# Patient Record
Sex: MALE | Race: WHITE | NOT HISPANIC OR LATINO | Employment: FULL TIME | URBAN - METROPOLITAN AREA
[De-identification: names, ages, dates, MRNs, and addresses within clinical notes are randomized per-mention and may not be internally consistent; named-entity substitution may affect disease eponyms.]

---

## 2017-06-19 ENCOUNTER — ALLSCRIPTS OFFICE VISIT (OUTPATIENT)
Dept: OTHER | Facility: OTHER | Age: 57
End: 2017-06-19

## 2017-12-19 ENCOUNTER — ALLSCRIPTS OFFICE VISIT (OUTPATIENT)
Dept: OTHER | Facility: OTHER | Age: 57
End: 2017-12-19

## 2017-12-20 NOTE — PROGRESS NOTES
Assessment  1  Multiple nevi (216 9) (D22 9)   2  Screening for skin condition (V82 0) (Z13 89)   3  Seborrheic keratosis (702 19) (L82 1)   4  H/O nonmelanoma skin cancer (V10 83) (Z85 828)    Plan   · Follow-up visit in 6 months Evaluation and Treatment  Follow-up  Status: Hold For -Scheduling  Requested for: 71ZLW4074   · Use a sun block product with an SPF of 15 or more ; Status:Complete;   Done:39Waf6604    Discussion/Summary  Discussion Summary- [facility]:  Assessment #1: Screening for dermatologic disorders  Care Plan:  Nothing else of concern noted on complete exam follow-up in 6 months lesion on the upper lip is not discernible at this time patient advise to return follow-up if this appears to be growing otherwise will recheck in 6 months  Assessment #2: Nevi  Care Plan:  Review the concept of ABCD and ugly duckling nothing markedly atypical noted on complete exam   Assessment #3: Seborrheic keratosis  Care Plan:  Patient reassured these are normal growths we acquire with age no treatment needed  Assessment #4: History of skin cancer  Care Plan:  No recurrence nothing else atypical sunblock recommended follow-up in 6 months  Chief Complaint  Chief Complaint Free Text Note Form: 6 month check up      History of Present Illness  HPI: 27-year-old male presents for overall skin check concerned regarding potential skin cancer patient with recent knee surgery was concerned regarding a small skin spot he noted on his left upper lip      Review of Systems  Complete Male Dermatology [facility] Patient:  Constitutional: Denies constitutional symptoms  Eyes: Denies eye symptoms  ENT:  denies ear symptoms, nasal symptoms, mouth or throat symptoms  Cardiovascular: Denies cardiovascular symptoms  Respiratory: Denies respiratory symptoms  Gastrointestinal: Denies gastrointestinal symptoms  Musculoskeletal: Denies musculoskeletal symptoms  Integumentary: Denies skin, hair and nail symptoms  Neurological: Denies neurologic symptoms  Psychiatric: Denies psychiatric symptoms  Endocrine: Denies endocrine symptoms  Hematologic/Lymphatic: Denies hematologic symptoms  Active Problems  1  Actinic keratosis (702 0) (L57 0)   2  Basal cell carcinoma of skin of nose (173 31) (C44 311)   3  Basal Cell Carcinoma Of Skin Of Trunk (173 51)   4  Changing skin lesion (709 9) (L98 9)   5  Chest pain, unspecified type (786 50) (R07 9)   6  Contusion of lower leg (924 10) (S80 10XA)   7  Dyskinesia (781 3) (G24 9)   8  Headache (784 0) (R51)   9  Hypertension (401 9) (I10)   10  Knee pain (719 46) (M25 569)   11  Malignant neoplasm of skin (173 90) (C44 90)   12  Multiple nevi (216 9) (D22 9)   13  Patellar tendonitis (726 64) (M76 50)   14  Prepatellar bursitis (726 65) (M70 40)   15  Screening for skin condition (V82 0) (Z13 89)   16  Seborrheic keratosis (702 19) (L82 1)   17  Strain of quadriceps tendon (844 9) (S76 119A)   18  Systemic lupus erythematosus (710 0) (M32 9)   19  Tear of medial meniscus of knee (836 0) (S83 249A)   20  Thigh pain (729 5) (M79 659)   21  Vitamin D deficiency (268 9) (E55 9)    Past Medical History  1  History of Basal Cell Carcinoma Of Skin Of Trunk (173 51)   2  H/O nonmelanoma skin cancer (V10 83) (O02 469)   3  History of basal cell carcinoma (V10 83) (Z85 828)   4  History of Hypertrophic condition of skin (701 9) (L91 9)   5  History of Lupus erythematosus (695 4) (L93 0)   6  History of Orthopedic Surgery  Past Medical History Reviewed- Derm:   The past medical history was reviewed  Surgical History  1  History of Ankle Surgery   2  History of Arthroscopy Knee Left   3  History of Arthroscopy Knee Right   4  History of Biopsy Temporal Artery   5  History of Knee Surgery   6  History of Orthopedic Surgery Right Hand   7  History of Rhinoplasty   8  History of Shoulder Surgery   9   History of Tonsillectomy  Surgical History Reviewed [facility]:   Surgical History reviewed      Family History  Mother    1  Family history of cataracts (V19 19) (J07 202)  Father    2  Family history of Diverticulitis   3  Family history of diabetes mellitus (V18 0) (Z83 3)   4  Family history of hyperlipidemia (V18 19) (Z83 49)   5  Family history of hypertension (V17 49) (Z82 49)  Family History Reviewed- Derm:   Family History was reviewed      Social History   · Employed   · Exercises moderately 3 or more times a week   · Four children   ·    · Never A Smoker   · Occasional alcohol use   · Occasional caffeine consumption  Social History Reviewed [facility]: The social history was reviewed      Current Meds   1  AmLODIPine Besylate 5 MG Oral Tablet; TAKE 1 TABLET DAILY FOR BLOOD PRESSURE; Therapy: 01Xgr8748 to (Evaluate:73Suf4592)  Requested for: 66Vjb7423; Last Rx:41Cfw1610 Ordered   2  Aspirin EC Lo-Dose 81 MG TBEC; TAKE 1 TABLET DAILY; Therapy: (Romelia Covelus) to Recorded   3  ClonazePAM 0 5 MG Oral Tablet; TAKE 1 TABLET TWICE DAILY AS NEEDED; Therapy: 88NVT7981 to (Evaluate:13Jun2015) Recorded   4  Fish Oil CAPS; Take 1 capsule twice daily; Therapy: (Romelia Covelus) to Recorded   5  Hydrocodone-Acetaminophen 5-325 MG Oral Tablet; take 1 tablet every 8 hours prn pain; Therapy: 77IVP1706 to (Evaluate:11Ank6970) Recorded   6  Ibuprofen 600 MG Oral Tablet; TAKE 1 TABLET 3 TIMES DAILY WITH FOOD AS NEEDED; Therapy: 24VFB1439 to (Evaluate:18Jun2015) Recorded   7  Inderal LA 60 MG Oral Capsule Extended Release 24 Hour; take 1 capsule daily; Therapy: (Romelia Hippo) to Recorded   8  Multivitamins CAPS; TAKE 1 CAPSULE DAILY; Therapy: (Romelia Hippo) to Recorded   9  Omeprazole 20 MG Oral Capsule Delayed Release; TAKE 1 CAPSULE DAILY; Therapy: (Romelia Covelus) to Recorded   10  Plaquenil 200 MG Oral Tablet; TAKE 1 TABLET TWICE DAILY; Therapy: (Romelia Hippo) to Recorded   11  Probiotic CAPS; USE AS DIRECTED; Therapy: (Romelia Hippo) to Recorded   12  Protonix 40 MG Oral Tablet Delayed Release; TAKE 1 TABLET DAILY; Therapy: (Jenise Castro) to Recorded   13  TraMADol HCl - 50 MG Oral Tablet; TAKE 1 TABLET 3 TIMES DAILY AS NEEDED; Last  Rx:27Apr2016 Ordered   14  Vitamin D TABS; Therapy: (Recorded:29Fsu7299) to Recorded  Medication List Reviewed: The medication list was reviewed and updated today  Allergies  1  nadolol    Physical Exam   Constitutional  General appearance: Appears healthy and well developed  Lymphatic  No visible disturbance  Musculoskeletal  Digits and nails: No clubbing, cyanosis or edema  Cutaneous and nail exam normal    Skin  Scalp skin texture and hair distribution: Normal skin texture on scalp, normal hair distribution  Head: Normal turgor, no rashes, no lesions  Neck: Normal turgor, no rashes, no lesions  Chest: Normal turgor, no rashes, no lesions  Abdomen: Normal turgor, no rashes, no lesions  Back: Normal turgor, no rashes, no lesions  Right upper extremity: Normal turgor, no rashes, no lesions  Left upper extremity: Normal turgor, no rashes, no lesions  Right lower extremity: Normal turgor, no rashes, no lesions  Left lower extremity: Normal turgor, no rashes, no lesions  Neuro/Psych  Alert and oriented x 3  Displays comfort and cooperation during encounterl  Affect is normal    Finding Previous sites of skin cancer well healed without recurrence normal keratotic papules with greasy stuck on appearance normal pigmented lesions with regular shape and color 1 mm papule noted on the upper lip difficult to discern morphology  Future Appointments    Date/Time Provider Specialty Site   06/20/2018 03:45 PM ROLANDA Delgado   [facility]ermatology [facility]     Signatures   Electronically signed by : ROLANDA Banerjee ; Dec 19 2017  4:37PM EST                       (Author)

## 2018-01-16 NOTE — RESULT NOTES
Message   tell patient lesion is benign     Verified Results  (1) TISSUE EXAM 49CGE3027 05:08PM Georgie Potter Order Number: [id number]     Test Name Result Flag Reference   LAB AP CASE REPORT (Report)     Surgical Pathology Report             Case: [id number]                   Authorizing Provider: Alyssa Camejo MD     Collected:      12/07/2016 1708        Pathologist:      Gregory Shaw MD      Received:      12/09/2016 1620        Specimen:  Other, L Nares   LAB AP FINAL DIAGNOSIS (Report)     A  Skin, Left Nares, shave biopsy:  - Fibrous papule of nose/face, minute superficial portions  - Negative for malignancy  Interpretation performed at [place] [address]  Electronically signed by Gregory Shaw MD on 12/14/2016 at 4:09 PM   LAB AP SURGICAL ADDITIONAL INFORMATION (Report)     These tests were developed and their performance characteristics   determined by [facility] or [address] [place] [address]  They may not be cleared or approved by the [place]  Food and   Drug Administration  The FDA has determined that such clearance or   approval is not necessary  These tests are used for clinical purposes  They should not be regarded as investigational or for research  This   laboratory has been approved by [place] [address], designated as a high-complexity   laboratory and is qualified to perform these tests  LAB AP GROSS DESCRIPTION (Report)     A  The specimen is received in formalin, labeled with the patient's name   and hospital number, and is designated left nares shave  The specimen   consists of 2 tan non-hairbearing shaves of skin measuring 0 1 cm and 0 2   cm in greatest dimension  The surfaces appear unremarkable  The apparent   margin of resection is inked blue, and the apparent surface is inked red  Entirely submitted  One cassette      Note: The estimated total formalin fixation time based upon information   provided by the submitting clinician and the standard processing schedule   is over 72 hours   [facility]   LAB AP CLINICAL INFORMATION      TW Order Number: [id number]  Angiofibroma vs  BCC

## 2018-01-24 NOTE — PROGRESS NOTES
Assessment    1  Changing skin lesion (709 9) (L98 9)   2  Multiple nevi (216 9) (D22 9)   3  Seborrheic keratosis (702 19) (L82 1)   4  Screening for skin condition (V82 0) (Z13 89)   5  H/O nonmelanoma skin cancer (V10 83) (Z85 828)    Plan    · Wound care as instructed ; Status:Complete;   Done: 71OJM2298   · (1) TISSUE EXAM; Status: In Progress - Specimen/Data Collected,Retrospective By  Protocol Authorization;   Done: 47ABB3193  A : JOELLE SULTANA Date/Time: : 20CLK5283  A : Skin Shave  Impression: : ANGIOFIBROMA VS BCC    · Use a sun block product with an SPF of 15 or more ; Status:Complete;   Done:  20XCN1576   · Follow-up visit in 6 months Evaluation and Treatment  Follow-up  Status: Complete   Done: 42XTB3122    Discussion/Summary  Discussion Summary- [facility]:   Assessment #1: Changing skin lesion  Care Plan:   Question atypia question of angiofibroma versus verruca await result of biopsy if further treatment indicated  Assessment #2: Nevi  Care Plan:   Review the concept of ABCD and ugly duckling nothing markedly atypical noted on complete exam    Assessment #3: Seborrheic keratosis  Care Plan:   Patient reassured these are normal growths we acquire with age no treatment needed  Assessment #4: History of skin cancer  Care Plan:   No recurrence nothing else atypical sunblock recommended follow-up in 6 months  Assessment #5: Screening for dermatologic disorders  Care Plan:   Nothing else of concern noted on complete exam follow-up in 6 months  Chief Complaint  Chief Complaint Free Text Note Form: 6 MONTH FULL BODY SKIN CANCER EXAM      History of Present Illness  HPI: 52-year-old male presents for overall checkup previous history skin cancer concerned regarding a new growth on his left nares      Review of Systems  Complete Male Dermatology [employer]- [facility] Patient:   Constitutional: Denies constitutional symptoms  Eyes: Denies eye symptoms     ENT:  denies ear symptoms, nasal symptoms, mouth or throat symptoms  Cardiovascular: Denies cardiovascular symptoms  Respiratory: Denies respiratory symptoms  Gastrointestinal: Denies gastrointestinal symptoms  Musculoskeletal: Denies musculoskeletal symptoms  Integumentary: Denies skin, hair and nail symptoms  Neurological: Denies neurologic symptoms  Psychiatric: Denies psychiatric symptoms  Endocrine: Denies endocrine symptoms  Hematologic/Lymphatic: Denies hematologic symptoms  Active Problems    1  Actinic keratosis (702 0) (L57 0)   2  Basal cell carcinoma of skin of nose (173 31) (C44 311)   3  Basal Cell Carcinoma Of Skin Of Trunk (173 51)   4  Changing skin lesion (709 9) (L98 9)   5  Chest pain, unspecified type (786 50) (R07 9)   6  Contusion of lower leg (924 10) (S80 10XA)   7  Dyskinesia (781 3) (G24 9)   8  Headache (784 0) (R51)   9  Hypertension (401 9) (I10)   10  Knee pain (719 46) (M25 569)   11  Malignant neoplasm of skin (173 90) (C44 90)   12  Multiple nevi (216 9) (D22 9)   13  Patellar tendonitis (726 64) (M76 50)   14  Prepatellar bursitis (726 65) (M70 40)   15  Screening for skin condition (V82 0) (Z13 89)   16  Seborrheic keratosis (702 19) (L82 1)   17  Strain of quadriceps tendon (844 9) (S76 119A)   18  Systemic lupus erythematosus (710 0) (M32 9)   19  Tear of medial meniscus of knee (836 0) (S83 249A)   20  Thigh pain (729 5) (M79 659)   21  Vitamin D deficiency (268 9) (E55 9)    Past Medical History    1  History of Basal Cell Carcinoma Of Skin Of Trunk (173 51)   2  H/O nonmelanoma skin cancer (V10 83) (D69 007)   3  History of basal cell carcinoma (V10 83) (Z85 828)   4  History of Hypertrophic condition of skin (701 9) (L91 9)   5  History of Lupus erythematosus (695 4) (L93 0)   6  History of Orthopedic Surgery  Past Medical History Reviewed- Derm:   The past medical history was reviewed  Surgical History    1  History of Ankle Surgery   2  History of Arthroscopy Knee Left   3  History of Arthroscopy Knee Right   4  History of Biopsy Temporal Artery   5  History of Knee Surgery   6  History of Orthopedic Surgery Right Hand   7  History of Rhinoplasty   8  History of Shoulder Surgery   9  History of Tonsillectomy  Surgical History Reviewed [facility]:   Surgical History reviewed      Family History  Mother    1  Family history of cataracts (V19 19) (U80 213)  Father    2  Family history of Diverticulitis   3  Family history of diabetes mellitus (V18 0) (Z83 3)   4  Family history of hyperlipidemia (V18 19) (Z83 49)   5  Family history of hypertension (V17 49) (Z82 49)  Family History Reviewed- Derm:   Family History was reviewed      Social History    · Employed   · Exercises moderately 3 or more times a week   · Four children   ·    · Never A Smoker   · Occasional alcohol use   · Occasional caffeine consumption  Social History Reviewed [facility]- [facility]: The social history was reviewed      Current Meds   1  AmLODIPine Besylate 5 MG Oral Tablet; TAKE 1 TABLET DAILY FOR BLOOD   PRESSURE; Therapy: 27Apr2016 to (Evaluate:89Dwa5894)  Requested for: 75Ovw3793; Last   Rx:22Gzr5132 Ordered   2  Aspirin EC Lo-Dose 81 MG TBEC; TAKE 1 TABLET DAILY; Therapy: (Chace Vasquez) to Recorded   3  ClonazePAM 0 5 MG Oral Tablet; TAKE 1 TABLET TWICE DAILY AS NEEDED; Therapy: 45JIP0453 to (Evaluate:13Jun2015) Recorded   4  Fish Oil CAPS; Take 1 capsule twice daily; Therapy: Rolando Vasquez) to Recorded   5  Hydrocodone-Acetaminophen 5-325 MG Oral Tablet; take 1 tablet every 8 hours prn pain; Therapy: 21MMX7741 to (Evaluate:90Qbi9494) Recorded   6  Ibuprofen 600 MG Oral Tablet; TAKE 1 TABLET 3 TIMES DAILY WITH FOOD AS NEEDED; Therapy: 12DVS4692 to (Evaluate:18Jun2015) Recorded   7  Inderal LA 60 MG Oral Capsule Extended Release 24 Hour; take 1 capsule daily; Therapy: (Chace Vasquez) to Recorded   8  Multivitamins CAPS; TAKE 1 CAPSULE DAILY;    Therapy: Rolando Vasquez) to Recorded   9  Omeprazole 20 MG Oral Capsule Delayed Release; TAKE 1 CAPSULE DAILY; Therapy: (Citlali Mccartney) to Recorded   10  Plaquenil 200 MG Oral Tablet; TAKE 1 TABLET TWICE DAILY; Therapy: (Citlali Mccartney) to Recorded   11  Probiotic CAPS; USE AS DIRECTED; Therapy: (Citlali Mccartney) to Recorded   12  Protonix 40 MG Oral Tablet Delayed Release; TAKE 1 TABLET DAILY; Therapy: (Citlali Mccartney) to Recorded   13  TraMADol HCl - 50 MG Oral Tablet; TAKE 1 TABLET 3 TIMES DAILY AS NEEDED; Last    Rx:27Apr2016 Ordered   14  Vitamin D TABS; Therapy: (Recorded:79Zdz7633) to Recorded  Medication List Reviewed: The medication list was reviewed and updated today  Allergies    1  nadolol    Physical Exam    Constitutional   General appearance: Appears healthy and well developed  Lymphatic   No visible disturbance  Musculoskeletal   Digits and nails: No clubbing, cyanosis or edema  Cutaneous and nail exam normal     Skin   Scalp skin texture and hair distribution: Normal skin texture on scalp, normal hair distribution  Head: Abnormal     Neck: Normal turgor, no rashes, no lesions  Chest: Normal turgor, no rashes, no lesions  Abdomen: Normal turgor, no rashes, no lesions  Back: Normal turgor, no rashes, no lesions  Right upper extremity: Normal turgor, no rashes, no lesions  Left upper extremity: Normal turgor, no rashes, no lesions  Right lower extremity: Normal turgor, no rashes, no lesions  Left lower extremity: Normal turgor, no rashes, no lesions  Neuro/Psych   Alert and oriented x 3  Displays comfort and cooperation during encounterl  Affect is normal     Finding 1 mm fleshy papules left nasal alar rim previous sites of skin cancer well-healed without recurrence normal pigmented lesions with regular shape and color nothing else remarkable normal keratotic papules with greasy stuck on appearance  Procedure    Procedure: skin biopsy     Indications for the procedure include the lesion has changed  Risks, benefits, alternatives, infection risk, bleeding risk, risk of allergic reaction and risk of scarring were discussed with the patient   verbal consent was obtained prior to the procedure  Procedure Note:   Anesthesia: 1 ml of lidocaine 1% with epinephrine  The patient was prepped using alcohol  a shave biopsy of the lesion was taken  The hemostasis of the wound was achieved with aluminum chloride  Dressing: The wound was cleaned and petroleum jelly was applied and a sterile dressing was placed  Specimen: the excised lesion was place in buffered formalin and sent for pathology  Post-Procedure:   Patient Status: the patient tolerated the procedure well  Complications: there were no complications  Follow-up in the office  patient will be called in event skin cancer is found  Patient can call the office in 7-10 days for results if not contacted  Future Appointments    Date/Time Provider Specialty Site   06/07/2017 04:15 PM ROLANDA Gil   Dermatology [facility]     Signatures   Electronically signed by : ROLANDA Jaquez ; Dec  7 2016  5:49PM EST                       (Author)

## 2018-06-20 ENCOUNTER — OFFICE VISIT (OUTPATIENT)
Dept: DERMATOLOGY | Facility: CLINIC | Age: 58
End: 2018-06-20
Payer: COMMERCIAL

## 2018-06-20 DIAGNOSIS — Z85.828 HISTORY OF SKIN CANCER: ICD-10-CM

## 2018-06-20 DIAGNOSIS — Z13.89 SCREENING FOR SKIN CONDITION: ICD-10-CM

## 2018-06-20 DIAGNOSIS — L82.1 SEBORRHEIC KERATOSIS: ICD-10-CM

## 2018-06-20 DIAGNOSIS — D22.9 NEVUS: ICD-10-CM

## 2018-06-20 DIAGNOSIS — L40.9 PSORIASIS: Primary | ICD-10-CM

## 2018-06-20 PROCEDURE — 99213 OFFICE O/P EST LOW 20 MIN: CPT | Performed by: DERMATOLOGY

## 2018-06-20 RX ORDER — BETAMETHASONE DIPROPIONATE 0.5 MG/G
OINTMENT TOPICAL 2 TIMES DAILY
Qty: 45 G | Refills: 1 | Status: SHIPPED | OUTPATIENT
Start: 2018-06-20 | End: 2018-08-24 | Stop reason: SDUPTHER

## 2018-06-20 NOTE — PROGRESS NOTES
[address] [facility] OF [address] [facility] DERMATOLOGY  [address] [place] [address]     MRN: [id number] : 1960  Encounter: [id number]  Patient Information: Stephane Leslie II  Chief complaint:Six-month checkup and rash on hands    History of present illness:  50-year-old male presents for overall skin check and previous history of skin cancer also concerned regarding rash on his hands that have been quite bothersome has been treating this with a moisturizing cream with minimal improvement patient without previous history of psoriasis or eczema but history of lupus noted though he does not have history of any cutaneous rashes  Patient had notes this has been a problem for about a month    No past medical history on file  No past surgical history on file  Social History   History   Alcohol use Not on file     History   Drug use: Unknown     History   Smoking Status    Never Smoker   Smokeless Tobacco    Never Used     No family history on file  Meds/Allergies   Allergies   Allergen Reactions    Nadolol Shortness Of Breath     Category:  Adverse Reaction;     Methyldopa      Other reaction(s): Unknown    Sertraline      tardivedyskinsia       Meds:  Prior to Admission medications    Not on File       Subjective:     Review of Systems:    General: negative for - chills, fatigue, fever,  weight gain or weight loss  Psychological: negative for - anxiety, behavioral disorder, concentration difficulties, decreased libido, depression, irritability, memory difficulties, mood swings, sleep disturbances or suicidal ideation  ENT: negative for - hearing difficulties , nasal congestion, nasal discharge, oral lesions, sinus pain, sneezing, sore throat  Allergy and Immunology: negative for - hives, insect bite sensitivity,  Hematological and Lymphatic: negative for - bleeding problems, blood clots,bruising, swollen lymph nodes  Endocrine: negative for - hair pattern changes, hot flashes, malaise/lethargy, mood swings, palpitations, polydipsia/polyuria, skin changes, temperature intolerance or unexpected weight change  Respiratory: negative for - cough, hemoptysis, orthopnea, shortness of breath, or wheezing  Cardiovascular: negative for - chest pain, dyspnea on exertion, edema,  Gastrointestinal: negative for - abdominal pain, nausea/vomiting  Genito-Urinary: negative for - dysuria, incontinence, irregular/heavy menses or urinary frequency/urgency  Musculoskeletal: negative for - gait disturbance, joint pain, joint stiffness, joint swelling, muscle pain, muscular weakness  Dermatological:  As in HPI  Neurological: negative for confusion, dizziness, headaches, impaired coordination/balance, memory loss, numbness/tingling, seizures, speech problems, tremors or weakness       Objective: There were no vitals taken for this visit  Physical Exam:    General Appearance:    Alert, cooperative, no distress   Head:    Normocephalic, without obvious abnormality, atraumatic           Skin:   A full skin exam was performed including scalp, head scalp, eyes, ears, nose, lips, neck, chest, axilla, abdomen, back, buttocks, bilateral upper extremities, bilateral lower extremities, hands, feet, fingers, toes, fingernails, and toenails  Erythema scaling noted on the palms and soles of the feet well-demarcated KOH prep was performed and negative previous site of skin cancers well healed without recurrence normal keratotic papules with greasy stuck on appearance normal pigmented lesions with regular shape and color nothing markedly atypical on exam     Assessment:     1  Psoriasis     2  Screening for skin condition     3  Seborrheic keratosis     4  History of skin cancer     5   Nevus           Plan:    psoriasis process on the hands and feet more consistent with psoriasis than with any other process lupus usually would involve the dorsum of the hands and arms where no involvement at this time however still possibility of this being lupus type rash also needs to be considered the we would treated similarly at this time will go ahead and treat with topical steroids and re-evaluate if no improvement  Nevi reviewed the concept of ABCDE and ugly duckling nothing markedly atypical patient reassured  Seborrheic keratosis patient reassured these are normal growths we acquire with age no treatment needed  History of skin cancer in no recurrence nothing else atypical sunblock recommended follow-up in 6 months  Screening for dermatologic disorders nothing else of concern noted on complete exam follow-up in 6 months    Araceli Rivera MD  2/54/5115,7:25 PM    Portions of the record may have been created with voice recognition software   Occasional wrong word or "sound a like" substitutions may have occurred due to the inherent limitations of voice recognition software   Read the chart carefully and recognize, using context, where substitutions have occurred

## 2018-06-20 NOTE — PATIENT INSTRUCTIONS
psoriasis process on the hands and feet more consistent with psoriasis than with any other process lupus usually would involve the dorsum of the hands and arms where no involvement at this time however still possibility of this being lupus type rash also needs to be considered the we would treated similarly at this time will go ahead and treat with topical steroids and re-evaluate if no improvement  Nevi reviewed the concept of ABCDE and ugly duckling nothing markedly atypical patient reassured  Seborrheic keratosis patient reassured these are normal growths we acquire with age no treatment needed  History of skin cancer in no recurrence nothing else atypical sunblock recommended follow-up in 6 months  Screening for dermatologic disorders nothing else of concern noted on complete exam follow-up in 6 months

## 2018-07-27 ENCOUNTER — TELEPHONE (OUTPATIENT)
Dept: INTERNAL MEDICINE CLINIC | Facility: CLINIC | Age: 58
End: 2018-07-27

## 2018-08-24 ENCOUNTER — OFFICE VISIT (OUTPATIENT)
Dept: DERMATOLOGY | Facility: CLINIC | Age: 58
End: 2018-08-24
Payer: COMMERCIAL

## 2018-08-24 DIAGNOSIS — Z13.89 SCREENING FOR SKIN CONDITION: ICD-10-CM

## 2018-08-24 DIAGNOSIS — L82.1 VERRUCOUS KERATOSIS: ICD-10-CM

## 2018-08-24 DIAGNOSIS — L82.1 SEBORRHEIC KERATOSIS: ICD-10-CM

## 2018-08-24 DIAGNOSIS — L23.7 POISON IVY DERMATITIS: Primary | ICD-10-CM

## 2018-08-24 DIAGNOSIS — L40.9 PSORIASIS: ICD-10-CM

## 2018-08-24 DIAGNOSIS — Z85.828 HISTORY OF SKIN CANCER: ICD-10-CM

## 2018-08-24 PROCEDURE — 17110 DESTRUCTION B9 LES UP TO 14: CPT | Performed by: DERMATOLOGY

## 2018-08-24 PROCEDURE — 99213 OFFICE O/P EST LOW 20 MIN: CPT | Performed by: DERMATOLOGY

## 2018-08-24 RX ORDER — HYDROXYCHLOROQUINE SULFATE 200 MG/1
200 TABLET, FILM COATED ORAL
Status: ON HOLD | COMMUNITY
End: 2019-03-08 | Stop reason: ALTCHOICE

## 2018-08-24 RX ORDER — PRIMIDONE 50 MG/1
TABLET ORAL EVERY 12 HOURS
COMMUNITY
End: 2019-02-11

## 2018-08-24 RX ORDER — BUSPIRONE HYDROCHLORIDE 10 MG/1
TABLET ORAL
COMMUNITY
End: 2019-02-11

## 2018-08-24 RX ORDER — ATENOLOL 50 MG/1
TABLET ORAL
COMMUNITY
End: 2019-02-11

## 2018-08-24 RX ORDER — AMLODIPINE BESYLATE AND ATORVASTATIN CALCIUM 10; 20 MG/1; MG/1
TABLET, FILM COATED ORAL
Status: ON HOLD | COMMUNITY
End: 2019-03-08 | Stop reason: ALTCHOICE

## 2018-08-24 RX ORDER — BETAMETHASONE DIPROPIONATE 0.5 MG/G
OINTMENT TOPICAL 2 TIMES DAILY
Qty: 45 G | Refills: 0 | Status: SHIPPED | OUTPATIENT
Start: 2018-08-24

## 2018-08-24 RX ORDER — DIAZEPAM 5 MG/1
TABLET ORAL
COMMUNITY
End: 2019-02-11

## 2018-08-24 RX ORDER — PSYLLIUM HUSK (WITH SUGAR) 3.4 G/7 G
POWDER (GRAM) ORAL
COMMUNITY
End: 2019-02-11

## 2018-08-24 RX ORDER — OMEPRAZOLE 20 MG/1
1 CAPSULE, DELAYED RELEASE ORAL DAILY
COMMUNITY
End: 2019-02-11

## 2018-08-24 RX ORDER — LORATADINE 10 MG/1
TABLET ORAL DAILY
COMMUNITY
End: 2019-02-11

## 2018-08-24 RX ORDER — INDOMETHACIN 25 MG/1
CAPSULE ORAL
COMMUNITY
End: 2019-02-11

## 2018-08-24 RX ORDER — VALSARTAN AND HYDROCHLOROTHIAZIDE 160; 25 MG/1; MG/1
TABLET ORAL
Status: ON HOLD | COMMUNITY
End: 2019-03-08 | Stop reason: ALTCHOICE

## 2018-08-24 RX ORDER — ACETAMINOPHEN 500 MG
650 TABLET ORAL EVERY 4 HOURS
COMMUNITY
End: 2019-02-11

## 2018-08-24 RX ORDER — DOXYCYCLINE HYCLATE 100 MG
TABLET ORAL
COMMUNITY
End: 2019-02-11

## 2018-08-24 RX ORDER — CYCLOBENZAPRINE HCL 10 MG
TABLET ORAL
COMMUNITY
End: 2019-02-11

## 2018-08-24 RX ORDER — BUTALBITAL, ACETAMINOPHEN AND CAFFEINE 300; 40; 50 MG/1; MG/1; MG/1
CAPSULE ORAL
COMMUNITY

## 2018-08-24 RX ORDER — CLONAZEPAM 0.5 MG/1
TABLET ORAL
COMMUNITY
Start: 2018-06-08

## 2018-08-24 RX ORDER — HYDROXYCHLOROQUINE SULFATE 200 MG/1
200 TABLET, FILM COATED ORAL 2 TIMES DAILY
Refills: 0 | COMMUNITY
Start: 2018-08-21 | End: 2019-02-11

## 2018-08-24 RX ORDER — BENZTROPINE MESYLATE 1 MG/1
TABLET ORAL
COMMUNITY
End: 2019-02-11

## 2018-08-24 RX ORDER — HYDROCHLOROTHIAZIDE 12.5 MG/1
CAPSULE, GELATIN COATED ORAL
Status: ON HOLD | COMMUNITY
End: 2019-03-08 | Stop reason: ALTCHOICE

## 2018-08-24 RX ORDER — PANTOPRAZOLE SODIUM 40 MG/1
TABLET, DELAYED RELEASE ORAL
COMMUNITY
End: 2019-02-11

## 2018-08-24 RX ORDER — CHLORAL HYDRATE 500 MG
CAPSULE ORAL EVERY 12 HOURS
COMMUNITY

## 2018-08-24 RX ORDER — MELOXICAM 15 MG/1
TABLET ORAL DAILY
COMMUNITY
End: 2019-02-11

## 2018-08-24 RX ORDER — PROPRANOLOL HYDROCHLORIDE 1 MG/ML
INJECTION, SOLUTION INTRAVENOUS
COMMUNITY
End: 2019-02-11

## 2018-08-24 RX ORDER — ONDANSETRON 4 MG/1
TABLET, ORALLY DISINTEGRATING ORAL
COMMUNITY
End: 2019-02-11

## 2018-08-24 RX ORDER — DICYCLOMINE HYDROCHLORIDE 10 MG/1
CAPSULE ORAL
COMMUNITY
End: 2019-02-11

## 2018-08-24 RX ORDER — BETAMETHASONE DIPROPIONATE 0.05 %
OINTMENT (GRAM) TOPICAL 2 TIMES DAILY
COMMUNITY
Start: 2018-08-22 | End: 2019-08-22

## 2018-08-24 RX ORDER — SERTRALINE HYDROCHLORIDE 100 MG/1
TABLET, FILM COATED ORAL
COMMUNITY
End: 2019-02-11

## 2018-08-24 NOTE — PATIENT INSTRUCTIONS
poison ivy will go ahead and treat this with topical steroids patient to call if this gets any worse hopefully it is on its way out at this time   psoriasis still active on the palms continue same treatment   verrucous keratosis lesion treated because the patient concern and irritation  Seborrheic keratosis patient reassured these are normal growths we acquire with age no treatment needed  History of skin cancer in no recurrence nothing else atypical sunblock recommended follow-up in 1 year  Screening for dermatologic disorders nothing else of concern noted on complete exam follow-up in 1 year  Treatment with Cryotherapy    The doctor has treated your skin with nitrogen, which is 320 degrees Fahrenheit below zero  He has given the treated area "frostbite "    Stinging should subside within a few hours  You can take Tylenol for pain, if needed  Over the next few days, it is normal if the area becomes reddened, a blood blister, or swollen with fluid  If the lesion treated was near the eye - you could get a swollen eye over the next few days  Do not panic! This is all temporary, and will resolve with time  There is no special treatment - just keep the area clean  Makeup and BandAids can be used, if preferred  When the area starts to dry up and peel off, using Vaseline can help healing  It usually takes up to a month for it to heal   Some lesions are recurrent and may require repeat treatments  If a lesion has not healed in one month, please don't hesitate to contact us  If you have any further questions that are not answered here, please call us  [phone]    Thank you for allowing us to care for you

## 2018-08-24 NOTE — PROGRESS NOTES
[facility]  [address] [place] [address]  [phone]     MRN: [id number] : 1960  Encounter: [id number]  Patient Information: Mitzi Ratliff II  Chief complaint skin rash lesion on lip as well as previous history of skin cancer  History of present illness: :60-year-old male presents for follow-up for overall checkup history of skin cancer and as well as a rash that developed over 2 weeks ago patient has been using some over-the-counter hydrocortisone with minimal improvement  Patient with history of skin sensitivity  To poison ivy  Patient also has a lesion on his lip that is been intermittently present  No past medical history on file  No past surgical history on file  Social History   History   Alcohol use Not on file     History   Drug use: Unknown     History   Smoking Status    Never Smoker   Smokeless Tobacco    Never Used     No family history on file  Meds/Allergies   Allergies   Allergen Reactions    Nadolol Shortness Of Breath     Category: Adverse Reaction;     Methyldopa      Other reaction(s): Unknown    Sertraline      tardivedyskinsia       Meds:  Prior to Admission medications    Medication Sig Start Date End Date Taking?  Authorizing Provider   betamethasone dipropionate (DIPROSONE) 0 05 % ointment Apply topically 2 (two) times a day 18 Yes Historical Provider, MD   acetaminophen (TYLENOL) 500 mg tablet Take 650 mg by mouth every 4 (four) hours    Historical Provider, MD   aluminum-magnesium hydroxide 200-200 MG/5ML suspension Take 30 mL by mouth every 4 (four) hours    Historical Provider, MD   amLODIPine-atorvastatin (CADUET) 10-20 MG per tablet amlodipine 10 mg-atorvastatin 20 mg tablet    Historical Provider, MD   aspirin 81 MG tablet Daily    Historical Provider, MD   atenolol (TENORMIN) 50 mg tablet atenolol 50 mg tablet    Historical Provider, MD   benztropine (COGENTIN) 1 mg tablet benztropine 1 mg tablet    Historical Provider, MD   betamethasone, augmented, (DIPROLENE) 0 05 % ointment Apply topically 2 (two) times a day To hands and feet 6/20/18   Jarod Latham MD   busPIRone (BUSPAR) 10 mg tablet buspirone 10 mg tablet    Historical Provider, MD   Butalbital-APAP-Caffeine -40 MG CAPS TAKE ONE CAPSULE BY MOUTH EVERY 4 HOURS    Historical Provider, MD   Cholecalciferol 32632 units CAPS Take 2,000 Units by mouth    Historical Provider, MD   clonazePAM (KlonoPIN) 0 5 mg tablet  6/8/18   Historical Provider, MD   [employer] FIBER GUMMIES 2 g CHEW Daily    Historical Provider, MD   cyclobenzaprine (FLEXERIL) 10 mg tablet cyclobenzaprine 10 mg tablet    Historical Provider, MD   DEXILANT 60 MG capsule Take 1 capsule by mouth daily 8/4/18   Historical Provider, MD   diazepam (VALIUM) 5 mg tablet diazepam 5 mg tablet    Historical Provider, MD   dicyclomine (BENTYL) 10 mg capsule dicyclomine 10 mg capsule    Historical Provider, MD   doxycycline hyclate (VIBRA-TABS) 100 mg tablet doxycycline hyclate 100 mg tablet    Historical Provider, MD   hydrochlorothiazide (MICROZIDE) 12 5 mg capsule hydrochlorothiazide 12 5 mg capsule    Historical Provider, MD   hydroxychloroquine (PLAQUENIL) 200 mg tablet Take 200 mg by mouth    Historical Provider, MD   hydroxychloroquine (PLAQUENIL) 200 mg tablet Take 200 mg by mouth 2 (two) times a day 8/21/18   Historical Provider, MD   indomethacin (INDOCIN) 25 mg capsule indomethacin 25 mg capsule    Historical Provider, MD   loratadine (CLARITIN) 10 mg tablet Daily    Historical Provider, MD   meloxicam (MOBIC) 15 mg tablet Daily    Historical Provider, MD   Multiple Vitamins-Minerals (MULTIVITAMIN ADULT EXTRA C PO) multivitamin    Historical Provider, MD   mupirocin (BACTROBAN) 2 % ointment mupirocin 2 % topical ointment    Historical Provider, MD   Omega-3 Fatty Acids (FISH OIL) 1,000 mg Every 12 hours    Historical Provider, MD   omeprazole (PriLOSEC) 20 mg delayed release capsule Take 1 capsule by mouth daily    Historical Provider, MD   ondansetron (ZOFRAN-ODT) 4 mg disintegrating tablet ondansetron 4 mg disintegrating tablet    Historical Provider, MD   pantoprazole (PROTONIX) 40 mg tablet pantoprazole 40 mg tablet,delayed release    Historical Provider, MD   primidone (MYSOLINE) 50 mg tablet Every 12 hours    Historical Provider, MD   Probiotic Product (PROBIOTIC-10 PO) Daily    Historical Provider, MD   Probiotic Product (PROBIOTIC-10) CAPS Take by mouth    Historical Provider, MD   propranolol (INDERAL) 1 mg/mL propranolol ER 80 mg capsule,24 hr,extended release    Historical Provider, MD   sertraline (ZOLOFT) 100 mg tablet sertraline 100 mg tablet    Historical Provider, MD   valsartan-hydrochlorothiazide (DIOVAN-HCT) 160-25 MG per tablet valsartan 160 mg-hydrochlorothiazide 25 mg tablet    Historical Provider, MD       Subjective:     Review of Systems:    General: negative for - chills, fatigue, fever,  weight gain or weight loss  Psychological: negative for - anxiety, behavioral disorder, concentration difficulties, decreased libido, depression, irritability, memory difficulties, mood swings, sleep disturbances or suicidal ideation  ENT: negative for - hearing difficulties , nasal congestion, nasal discharge, oral lesions, sinus pain, sneezing, sore throat  Allergy and Immunology: negative for - hives, insect bite sensitivity,  Hematological and Lymphatic: negative for - bleeding problems, blood clots,bruising, swollen lymph nodes  Endocrine: negative for - hair pattern changes, hot flashes, malaise/lethargy, mood swings, palpitations, polydipsia/polyuria, skin changes, temperature intolerance or unexpected weight change  Respiratory: negative for - cough, hemoptysis, orthopnea, shortness of breath, or wheezing  Cardiovascular: negative for - chest pain, dyspnea on exertion, edema,  Gastrointestinal: negative for - abdominal pain, nausea/vomiting  Genito-Urinary: negative for - dysuria, incontinence, irregular/heavy menses or urinary frequency/urgency  Musculoskeletal: negative for - gait disturbance, joint pain, joint stiffness, joint swelling, muscle pain, muscular weakness  Dermatological:  As in HPI  Neurological: negative for confusion, dizziness, headaches, impaired coordination/balance, memory loss, numbness/tingling, seizures, speech problems, tremors or weakness       Objective: There were no vitals taken for this visit  Physical Exam:    General Appearance:    Alert, cooperative, no distress   Head:    Normocephalic, without obvious abnormality, atraumatic           Skin:   A full skin exam was performed including scalp, head scalp, eyes, ears, nose, lips, neck, chest, axilla, abdomen, back, buttocks, bilateral upper extremities, bilateral lower extremities, hands, feet, fingers, toes, fingernails, and toenails   Small keratotic papule noted on the left upper lip at the vermilion border previous site of skin cancer well healed without recurrence normal keratotic papules greasy stuck on appearance erythema scaling well-demarcated patches noted on the lower legs and buttocks nothing else atypical noted on exam  Cryotherapy Procedure Note    Pre-operative Diagnosis: verrucous keratosis    Plan:  1  Instructed to keep the area dry and clean thereafter  Apply petrolatum if area gets crusty  2  Recommended that the patient use acetaminophen  as needed for pain  Locations:  Left upper lip    Indications: Destruction of  Lesions x1    Patient informed of risks (permanent scarring, infection, light or dark discoloration, bleeding, infection, weakness, numbness and recurrence of the lesion) and benefits of the procedure and verbal informed consent obtained  The areas are treated with liquid nitrogen therapy, frozen until ice ball extended 2 mm beyond lesion, allowed to thaw, and treated again   The patient tolerated procedure well   The patient was instructed on post-op care, warned that there may be blister formation, redness and pain  Recommend OTC analgesia as needed for pain  Condition:  Stable    Complications:  none  Assessment:     1  Poison ivy dermatitis     2  Seborrheic keratosis     3  Screening for skin condition     4  Psoriasis     5  History of skin cancer     6  Verrucous keratosis           Plan:    poison ivy will go ahead and treat this with topical steroids patient to call if this gets any worse hopefully it is on its way out at this time   psoriasis still active on the palms continue same treatment   verrucous keratosis lesion treated because the patient concern and irritation  Seborrheic keratosis patient reassured these are normal growths we acquire with age no treatment needed  History of skin cancer in no recurrence nothing else atypical sunblock recommended follow-up in 1 year  Screening for dermatologic disorders nothing else of concern noted on complete exam follow-up in 1 year      Ora Caruso MD  8/24/2018,12:08 PM    Portions of the record may have been created with voice recognition software   Occasional wrong word or "sound a like" substitutions may have occurred due to the inherent limitations of voice recognition software   Read the chart carefully and recognize, using context, where substitutions have occurred

## 2019-02-06 RX ORDER — ACETAMINOPHEN 160 MG
TABLET,DISINTEGRATING ORAL DAILY
COMMUNITY

## 2019-02-06 RX ORDER — CEPHALEXIN 500 MG/1
CAPSULE ORAL
Status: ON HOLD | COMMUNITY
End: 2019-03-08 | Stop reason: ALTCHOICE

## 2019-02-06 RX ORDER — BUTALBITAL/ASPIRIN/CAFFEINE 50-325-40
1 CAPSULE ORAL EVERY 4 HOURS
COMMUNITY
End: 2019-02-11

## 2019-02-06 RX ORDER — AZITHROMYCIN 250 MG/1
TABLET, FILM COATED ORAL
Refills: 0 | COMMUNITY
Start: 2019-01-17 | End: 2019-02-11

## 2019-02-06 RX ORDER — METRONIDAZOLE 500 MG/1
500 TABLET ORAL 3 TIMES DAILY
Refills: 0 | COMMUNITY
Start: 2019-01-02 | End: 2019-02-11

## 2019-02-06 RX ORDER — CIPROFLOXACIN 500 MG/1
500 TABLET, FILM COATED ORAL 2 TIMES DAILY
Refills: 0 | COMMUNITY
Start: 2019-01-02 | End: 2019-02-11

## 2019-02-06 RX ORDER — BENZONATATE 200 MG/1
CAPSULE ORAL
Refills: 0 | COMMUNITY
Start: 2019-01-17 | End: 2019-02-11

## 2019-02-06 RX ORDER — PROPRANOLOL HYDROCHLORIDE 80 MG/1
CAPSULE, EXTENDED RELEASE ORAL
Refills: 2 | COMMUNITY
Start: 2019-01-16 | End: 2019-02-11

## 2019-02-06 RX ORDER — TIZANIDINE 4 MG/1
4 TABLET ORAL EVERY 24 HOURS
COMMUNITY
Start: 2015-01-07 | End: 2019-02-11

## 2019-02-06 RX ORDER — DICYCLOMINE HCL 20 MG
20 TABLET ORAL EVERY 6 HOURS PRN
Refills: 0 | COMMUNITY
Start: 2019-01-02 | End: 2019-02-11

## 2019-02-06 RX ORDER — METHYLPREDNISOLONE 4 MG/1
TABLET ORAL
Refills: 0 | COMMUNITY
Start: 2019-01-17 | End: 2019-02-11

## 2019-02-11 ENCOUNTER — OFFICE VISIT (OUTPATIENT)
Dept: GASTROENTEROLOGY | Facility: CLINIC | Age: 59
End: 2019-02-11
Payer: COMMERCIAL

## 2019-02-11 VITALS
BODY MASS INDEX: 33.12 KG/M2 | SYSTOLIC BLOOD PRESSURE: 122 MMHG | WEIGHT: 240.8 LBS | HEART RATE: 54 BPM | DIASTOLIC BLOOD PRESSURE: 88 MMHG

## 2019-02-11 DIAGNOSIS — R10.32 LEFT LOWER QUADRANT PAIN: Primary | ICD-10-CM

## 2019-02-11 DIAGNOSIS — K21.9 GASTROESOPHAGEAL REFLUX DISEASE WITHOUT ESOPHAGITIS: ICD-10-CM

## 2019-02-11 PROCEDURE — 99214 OFFICE O/P EST MOD 30 MIN: CPT | Performed by: INTERNAL MEDICINE

## 2019-02-11 NOTE — PROGRESS NOTES
[facility] - Outpatient Follow-up Note  Harleen Colmenares II 63 y o  male MRN: [id number]  Encounter: [id number]          ASSESSMENT AND PLAN:      1  Left lower quadrant pain  Recurrent diverticulitis s/p treatment with cipro and flagyl  Continue fiber and probiotic     ______________________________________________________________________    SUBJECTIVE:  63year old male presents for follow up of diverticulitis  He was diagnosed at the [facility] ER in the beginning of January  He was initially treated with Augmentin but did not fully improve and thus was treated with Cipro and Flagyl  He is better but admits to some altered bowel habits and bloating  He is taking a probiotic and is trying to maintain a high fiber diet  His last colonoscopy was 4/2018 which showed diverticulosis and a hyperplastic polyp  He had an EGD 4/2018 which showed Barretts Esophagus  He denies heartburn symptoms at present  REVIEW OF SYSTEMS IS OTHERWISE NEGATIVE        Historical Information   Past Medical History:   Diagnosis Date    Diverticulitis of colon     Dyskinesia     Hypertension     Lupus     Movement disorder      Past Surgical History:   Procedure Laterality Date    ANKLE SURGERY      COLONOSCOPY  2018    HAND SURGERY      left index surgery    KNEE SURGERY      bi lateral meniscus tears    SHOULDER SURGERY      SINUS SURGERY      TONSILLECTOMY       Social History   Social History     Substance and Sexual Activity   Alcohol Use Yes    Comment: social     Social History     Substance and Sexual Activity   Drug Use Never     Social History     Tobacco Use   Smoking Status Never Smoker   Smokeless Tobacco Never Used     Family History   Problem Relation Age of Onset    Hypertension Father        Meds/Allergies       Current Outpatient Medications:     aluminum-magnesium hydroxide 200-200 MG/5ML suspension    amLODIPine-atorvastatin (CADUET) 10-20 MG per tablet    aspirin 81 MG tablet    betamethasone dipropionate (DIPROSONE) 0 05 % ointment    betamethasone, augmented, (DIPROLENE) 0 05 % ointment    Butalbital-APAP-Caffeine -40 MG CAPS    cephalexin (KEFLEX) 500 mg capsule    Cholecalciferol (VITAMIN D3) 2000 units capsule    Cholecalciferol 56611 units CAPS    clonazePAM (KlonoPIN) 0 5 mg tablet    DEXILANT 60 MG capsule    hydrochlorothiazide (MICROZIDE) 12 5 mg capsule    hydroxychloroquine (PLAQUENIL) 200 mg tablet    Multiple Vitamins-Minerals (MULTIVITAMIN ADULT EXTRA C PO)    Omega-3 Fatty Acids (FISH OIL) 1,000 mg    Probiotic Product (PROBIOTIC-10 PO)    propranolol (INDERAL LA) 80 mg 24 hr capsule    valsartan-hydrochlorothiazide (DIOVAN-HCT) 160-25 MG per tablet    acetaminophen (TYLENOL) 500 mg tablet    atenolol (TENORMIN) 50 mg tablet    azithromycin (ZITHROMAX) 250 mg tablet    benzonatate (TESSALON) 200 MG capsule    benztropine (COGENTIN) 1 mg tablet    busPIRone (BUSPAR) 10 mg tablet    butalbital-acetaminophen-caffeine-codeine (FIORICET WITH CODEINE) -70-30 MG per capsule    ciprofloxacin (CIPRO) 500 mg tablet    [employer] FIBER GUMMIES 2 g CHEW    cyclobenzaprine (FLEXERIL) 10 mg tablet    diazepam (VALIUM) 5 mg tablet    dicyclomine (BENTYL) 10 mg capsule    dicyclomine (BENTYL) 20 mg tablet    doxycycline hyclate (VIBRA-TABS) 100 mg tablet    hydroxychloroquine (PLAQUENIL) 200 mg tablet    indomethacin (INDOCIN) 25 mg capsule    loratadine (CLARITIN) 10 mg tablet    meloxicam (MOBIC) 15 mg tablet    methylPREDNISolone 4 MG tablet therapy pack    metroNIDAZOLE (FLAGYL) 500 mg tablet    mupirocin (BACTROBAN) 2 % ointment    omeprazole (PriLOSEC) 20 mg delayed release capsule    ondansetron (ZOFRAN-ODT) 4 mg disintegrating tablet    pantoprazole (PROTONIX) 40 mg tablet    primidone (MYSOLINE) 50 mg tablet    Probiotic Product (PROBIOTIC-10) CAPS    propranolol (INDERAL) 1 mg/mL    sertraline (ZOLOFT) 100 mg tablet    tiZANidine (ZANAFLEX) 4 mg tablet    Allergies   Allergen Reactions    Nadolol Shortness Of Breath     Category: Adverse Reaction;     Methyldopa      Other reaction(s): Unknown    Sertraline      tardivedyskinsia           Objective     Blood pressure 122/88, pulse (!) 54, weight 109 kg (240 lb 12 8 oz)  Body mass index is 33 12 kg/m²  PHYSICAL EXAM:      General Appearance:   Alert, cooperative, no distress   HEENT:   Normocephalic, atraumatic, anicteric      Neck:  Supple, symmetrical, trachea midline   Lungs:   Clear to auscultation bilaterally; no rales, rhonchi or wheezing; respirations unlabored    Heart[de-identified]   Regular rate and rhythm; no murmur, rub, or gallop  Abdomen:   Soft, tender to palpation RLQ and LLQ, non-distended; normal bowel sounds; no masses, no organomegaly    Genitalia:   Deferred    Rectal:   Deferred    Extremities:  No cyanosis, clubbing or edema    Pulses:  2+ and symmetric    Skin:  No jaundice, rashes, or lesions    Lymph nodes:  No palpable cervical lymphadenopathy        Lab Results:   No visits with results within 1 Day(s) from this visit  Latest known visit with results is:   Lab Requisition on 12/07/2016   Component Date Value    Case Report 12/07/2016                      Value:Surgical Pathology Report                         Case: [id number]                                   Authorizing Provider:  Briseida Ny MD          Collected:           12/07/2016 0100              Pathologist:           Evelyn Vasquez MD           Received:            12/09/2016 1755              Specimen:    Other, L Cornel                                                                             Final Diagnosis 12/07/2016                      Value: This result contains rich text formatting which cannot be displayed here   Additional Information 12/07/2016                      Value: This result contains rich text formatting which cannot be displayed here     Ej Abdi Description 12/07/2016                      Value: This result contains rich text formatting which cannot be displayed here   Clinical Information 12/07/2016                      Value:TW Order Number: [id number]  [facility]         Radiology Results:   No results found

## 2019-02-11 NOTE — LETTER
February 11, 2019     Neftali No MD  [address] [facility]  [address] [place] [address]    Patient: Kierra Alejandro II   YOB: 1960   Date of Visit: 2/11/2019       Dear Dr Rubin Trejo: Thank you for referring Kei Savage to me for evaluation  Below are my notes for this consultation  If you have questions, please do not hesitate to call me  I look forward to following your patient along with you  Sincerely,        Krzysztof Abrams DO        CC: No Recipients  Krzysztof Abrams DO  2/11/2019  4:22 PM  Sign at close encounter  [facility] - Outpatient Follow-up Note  Kierra Alejandro II 63 y o  male MRN: [id number]  Encounter: [id number]          ASSESSMENT AND PLAN:      1  Left lower quadrant pain  Recurrent diverticulitis s/p treatment with cipro and flagyl  Continue fiber and probiotic     ______________________________________________________________________    SUBJECTIVE:  63year old male presents for follow up of diverticulitis  He was diagnosed at the [facility] ER in the beginning of January  He was initially treated with Augmentin but did not fully improve and thus was treated with Cipro and Flagyl  He is better but admits to some altered bowel habits and bloating  He is taking a probiotic and is trying to maintain a high fiber diet  His last colonoscopy was 4/2018 which showed diverticulosis and a hyperplastic polyp  He had an EGD 4/2018 which showed Barretts Esophagus  He denies heartburn symptoms at present  REVIEW OF SYSTEMS IS OTHERWISE NEGATIVE        Historical Information   Past Medical History:   Diagnosis Date    Diverticulitis of colon     Dyskinesia     Hypertension     Lupus     Movement disorder      Past Surgical History:   Procedure Laterality Date    ANKLE SURGERY      COLONOSCOPY  2018    HAND SURGERY      left index surgery    KNEE SURGERY      bi lateral meniscus tears    SHOULDER SURGERY      SINUS SURGERY      TONSILLECTOMY       Social History   Social History     Substance and Sexual Activity   Alcohol Use Yes    Comment: social     Social History     Substance and Sexual Activity   Drug Use Never     Social History     Tobacco Use   Smoking Status Never Smoker   Smokeless Tobacco Never Used     Family History   Problem Relation Age of Onset    Hypertension Father        Meds/Allergies       Current Outpatient Medications:     aluminum-magnesium hydroxide 200-200 MG/5ML suspension    amLODIPine-atorvastatin (CADUET) 10-20 MG per tablet    aspirin 81 MG tablet    betamethasone dipropionate (DIPROSONE) 0 05 % ointment    betamethasone, augmented, (DIPROLENE) 0 05 % ointment    Butalbital-APAP-Caffeine -40 MG CAPS    cephalexin (KEFLEX) 500 mg capsule    Cholecalciferol (VITAMIN D3) 2000 units capsule    Cholecalciferol 37508 units CAPS    clonazePAM (KlonoPIN) 0 5 mg tablet    DEXILANT 60 MG capsule    hydrochlorothiazide (MICROZIDE) 12 5 mg capsule    hydroxychloroquine (PLAQUENIL) 200 mg tablet    Multiple Vitamins-Minerals (MULTIVITAMIN ADULT EXTRA C PO)    Omega-3 Fatty Acids (FISH OIL) 1,000 mg    Probiotic Product (PROBIOTIC-10 PO)    propranolol (INDERAL LA) 80 mg 24 hr capsule    valsartan-hydrochlorothiazide (DIOVAN-HCT) 160-25 MG per tablet    acetaminophen (TYLENOL) 500 mg tablet    atenolol (TENORMIN) 50 mg tablet    azithromycin (ZITHROMAX) 250 mg tablet    benzonatate (TESSALON) 200 MG capsule    benztropine (COGENTIN) 1 mg tablet    busPIRone (BUSPAR) 10 mg tablet    butalbital-acetaminophen-caffeine-codeine (FIORICET WITH CODEINE) -22-30 MG per capsule    ciprofloxacin (CIPRO) 500 mg tablet    [employer] FIBER GUMMIES 2 g CHEW    cyclobenzaprine (FLEXERIL) 10 mg tablet    diazepam (VALIUM) 5 mg tablet    dicyclomine (BENTYL) 10 mg capsule    dicyclomine (BENTYL) 20 mg tablet    doxycycline hyclate (VIBRA-TABS) 100 mg tablet    hydroxychloroquine (PLAQUENIL) 200 mg tablet    indomethacin (INDOCIN) 25 mg capsule    loratadine (CLARITIN) 10 mg tablet    meloxicam (MOBIC) 15 mg tablet    methylPREDNISolone 4 MG tablet therapy pack    metroNIDAZOLE (FLAGYL) 500 mg tablet    mupirocin (BACTROBAN) 2 % ointment    omeprazole (PriLOSEC) 20 mg delayed release capsule    ondansetron (ZOFRAN-ODT) 4 mg disintegrating tablet    pantoprazole (PROTONIX) 40 mg tablet    primidone (MYSOLINE) 50 mg tablet    Probiotic Product (PROBIOTIC-10) CAPS    propranolol (INDERAL) 1 mg/mL    sertraline (ZOLOFT) 100 mg tablet    tiZANidine (ZANAFLEX) 4 mg tablet    Allergies   Allergen Reactions    Nadolol Shortness Of Breath     Category: Adverse Reaction;     Methyldopa      Other reaction(s): Unknown    Sertraline      tardivedyskinsia           Objective     Blood pressure 122/88, pulse (!) 54, weight 109 kg (240 lb 12 8 oz)  Body mass index is 33 12 kg/m²  PHYSICAL EXAM:      General Appearance:   Alert, cooperative, no distress   HEENT:   Normocephalic, atraumatic, anicteric      Neck:  Supple, symmetrical, trachea midline   Lungs:   Clear to auscultation bilaterally; no rales, rhonchi or wheezing; respirations unlabored    Heart[de-identified]   Regular rate and rhythm; no murmur, rub, or gallop  Abdomen:   Soft, tender to palpation RLQ and LLQ, non-distended; normal bowel sounds; no masses, no organomegaly    Genitalia:   Deferred    Rectal:   Deferred    Extremities:  No cyanosis, clubbing or edema    Pulses:  2+ and symmetric    Skin:  No jaundice, rashes, or lesions    Lymph nodes:  No palpable cervical lymphadenopathy        Lab Results:   No visits with results within 1 Day(s) from this visit     Latest known visit with results is:   Lab Requisition on 12/07/2016   Component Date Value    Case Report 12/07/2016                      Value:Surgical Pathology Report                         Case: [id number]                                   Authorizing Provider:  Edwin Garcia Rogerio Parks MD          Collected:           12/07/2016 1708              Pathologist:           Itzel Baker MD           Received:            12/09/2016 1620              Specimen:    Other, L Naropal                                                                             Final Diagnosis 12/07/2016                      Value: This result contains rich text formatting which cannot be displayed here   Additional Information 12/07/2016                      Value: This result contains rich text formatting which cannot be displayed here  Roseanna Yeager Gross Description 12/07/2016                      Value: This result contains rich text formatting which cannot be displayed here   Clinical Information 12/07/2016                      Value: Order Number: [id number]  [facility]  [facility]         Radiology Results:   No results found

## 2019-02-12 PROBLEM — K22.70 BARRETT'S ESOPHAGUS WITHOUT DYSPLASIA: Status: ACTIVE | Noted: 2019-02-12

## 2019-02-12 PROBLEM — K21.9 GASTRO-ESOPHAGEAL REFLUX DISEASE WITHOUT ESOPHAGITIS: Status: ACTIVE | Noted: 2019-02-12

## 2019-02-13 ENCOUNTER — TELEPHONE (OUTPATIENT)
Dept: GASTROENTEROLOGY | Facility: CLINIC | Age: 59
End: 2019-02-13

## 2019-02-13 NOTE — LETTER
February 13, 2019     Gee Salazar 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

## 2019-02-13 NOTE — TELEPHONE ENCOUNTER
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

## 2019-02-13 NOTE — LETTER
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

## 2019-03-08 ENCOUNTER — ANESTHESIA (OUTPATIENT)
Dept: PERIOP | Facility: HOSPITAL | Age: 59
End: 2019-03-08
Payer: COMMERCIAL

## 2019-03-08 ENCOUNTER — ANESTHESIA EVENT (OUTPATIENT)
Dept: PERIOP | Facility: HOSPITAL | Age: 59
End: 2019-03-08
Payer: COMMERCIAL

## 2019-03-08 ENCOUNTER — HOSPITAL ENCOUNTER (OUTPATIENT)
Facility: HOSPITAL | Age: 59
Setting detail: OUTPATIENT SURGERY
Discharge: HOME | End: 2019-03-08
Attending: INTERNAL MEDICINE | Admitting: INTERNAL MEDICINE
Payer: COMMERCIAL

## 2019-03-08 VITALS
TEMPERATURE: 98.7 F | DIASTOLIC BLOOD PRESSURE: 73 MMHG | SYSTOLIC BLOOD PRESSURE: 115 MMHG | HEIGHT: 72 IN | OXYGEN SATURATION: 96 % | RESPIRATION RATE: 18 BRPM | HEART RATE: 55 BPM | WEIGHT: 238.32 LBS | BODY MASS INDEX: 32.28 KG/M2

## 2019-03-08 DIAGNOSIS — K22.70 BARRETT'S ESOPHAGUS WITHOUT DYSPLASIA: ICD-10-CM

## 2019-03-08 DIAGNOSIS — K21.9 GASTRO-ESOPHAGEAL REFLUX DISEASE WITHOUT ESOPHAGITIS: ICD-10-CM

## 2019-03-08 PROCEDURE — 88305 TISSUE EXAM BY PATHOLOGIST: CPT | Performed by: PATHOLOGY

## 2019-03-08 PROCEDURE — 43239 EGD BIOPSY SINGLE/MULTIPLE: CPT | Performed by: INTERNAL MEDICINE

## 2019-03-08 RX ORDER — SULFAMETHOXAZOLE AND TRIMETHOPRIM 400; 80 MG/1; MG/1
2 TABLET ORAL EVERY 12 HOURS SCHEDULED
COMMUNITY

## 2019-03-08 RX ORDER — PROPOFOL 10 MG/ML
INJECTION, EMULSION INTRAVENOUS AS NEEDED
Status: DISCONTINUED | OUTPATIENT
Start: 2019-03-08 | End: 2019-03-08 | Stop reason: SURG

## 2019-03-08 RX ORDER — HYDROXYCHLOROQUINE SULFATE 200 MG/1
200 TABLET, FILM COATED ORAL 2 TIMES DAILY WITH MEALS
COMMUNITY

## 2019-03-08 RX ORDER — SODIUM CHLORIDE, SODIUM LACTATE, POTASSIUM CHLORIDE, CALCIUM CHLORIDE 600; 310; 30; 20 MG/100ML; MG/100ML; MG/100ML; MG/100ML
100 INJECTION, SOLUTION INTRAVENOUS CONTINUOUS
Status: DISCONTINUED | OUTPATIENT
Start: 2019-03-08 | End: 2019-03-08 | Stop reason: HOSPADM

## 2019-03-08 RX ORDER — LIDOCAINE HYDROCHLORIDE 10 MG/ML
INJECTION, SOLUTION INFILTRATION; PERINEURAL AS NEEDED
Status: DISCONTINUED | OUTPATIENT
Start: 2019-03-08 | End: 2019-03-08 | Stop reason: SURG

## 2019-03-08 RX ORDER — METRONIDAZOLE 500 MG/1
500 TABLET ORAL EVERY 8 HOURS SCHEDULED
COMMUNITY

## 2019-03-08 RX ORDER — AMLODIPINE BESYLATE 10 MG/1
10 TABLET ORAL DAILY
COMMUNITY

## 2019-03-08 RX ADMIN — PROPOFOL 130 MG: 10 INJECTION, EMULSION INTRAVENOUS at 10:18

## 2019-03-08 RX ADMIN — LIDOCAINE HYDROCHLORIDE 100 MG: 10 INJECTION, SOLUTION INFILTRATION; PERINEURAL at 10:18

## 2019-03-08 RX ADMIN — SODIUM CHLORIDE, SODIUM LACTATE, POTASSIUM CHLORIDE, AND CALCIUM CHLORIDE: .6; .31; .03; .02 INJECTION, SOLUTION INTRAVENOUS at 10:04

## 2019-03-08 NOTE — ANESTHESIA POSTPROCEDURE EVALUATION
Post-Op Assessment Note    CV Status:  Stable  Pain Score: 0    Pain management: adequate     Mental Status:  Alert and awake   Hydration Status:  Stable   PONV Controlled:  None   Airway Patency:  Patent   Post Op Vitals Reviewed: Yes      Staff: Anesthesiologist, CRNA           BP   130/79   Temp      Pulse  52   Resp   18   SpO2   99%

## 2019-03-08 NOTE — H&P
History and Physical -  [facility]  Mallissa Form III 63 y o  male MRN: [id number]      HPI: Trevin Ramsey is a 63y o  year old male who presents for gastroesophageal reflux disease and Le's esophagus      REVIEW OF SYSTEMS: Per the HPI, and otherwise unremarkable      Historical Information   Past Medical History:   Diagnosis Date    Diverticulitis of colon     Dyskinesia     Fatty liver     Hypertension     Lupus     Movement disorder      Past Surgical History:   Procedure Laterality Date    ANKLE SURGERY      COLONOSCOPY  2018    HAND SURGERY      left index surgery    KNEE SURGERY      bi lateral meniscus tears both x2    SHOULDER SURGERY      SINUS SURGERY      TONSILLECTOMY       Social History   Social History     Substance and Sexual Activity   Alcohol Use Yes    Frequency: 2-4 times a month    Comment: social     Social History     Substance and Sexual Activity   Drug Use Never     Social History     Tobacco Use   Smoking Status Never Smoker   Smokeless Tobacco Never Used     Family History   Problem Relation Age of Onset    Hypertension Father        Meds/Allergies     Medications Prior to Admission   Medication    amLODIPine (NORVASC) 10 mg tablet    aspirin 81 MG tablet    Cholecalciferol (VITAMIN D3) 2000 units capsule    Cholecalciferol 80275 units CAPS    clonazePAM (KlonoPIN) 0 5 mg tablet    DEXILANT 60 MG capsule    hydroxychloroquine (PLAQUENIL) 200 mg tablet    metroNIDAZOLE (FLAGYL) 500 mg tablet    Multiple Vitamins-Minerals (MULTIVITAMIN ADULT EXTRA C PO)    Omega-3 Fatty Acids (FISH OIL) 1,000 mg    Probiotic Product (PROBIOTIC-10 PO)    Propranolol HCl (INDERAL PO)    sulfamethoxazole-trimethoprim (BACTRIM) 400-80 mg per tablet    betamethasone dipropionate (DIPROSONE) 0 05 % ointment    betamethasone, augmented, (DIPROLENE) 0 05 % ointment    Butalbital-APAP-Caffeine -40 MG CAPS       Allergies   Allergen Reactions    Methyldopa      Other reaction(s): Unknown    Sertraline      tardivedyskinsia       Objective     Blood pressure 123/76, pulse 56, temperature 97 8 °F (36 6 °C), temperature source Oral, resp  rate 16, height 6' (1 829 m), weight 108 kg (238 lb 5 1 oz), SpO2 96 %  PHYSICAL EXAM    Gen: NAD  CV: RRR  CHEST: Clear  ABD: soft, NT/ND  EXT: no edema      ASSESSMENT/PLAN:  This is a 63y o  year old male here for gastroesophageal reflux disease, Le's esophagus, and he is stable and optimized for his procedure      Perform esophagogastroduodenoscopy with biopsy

## 2019-03-08 NOTE — OP NOTE
ESOPHAGOGASTRODUODENOSCOPY    PROCEDURE: EGD    SEDATION: Monitored anesthesia care, check anesthesia records    ASA Class: 2    INDICATIONS:  Gastroesophageal reflux disease with Le's esophagus, short-segment    CONSENT:  Informed consent was obtained for the procedure, including sedation after explaining the risks and benefits of the procedure  Risks including but not limited to bleeding, perforation, infection, and missed lesion  PREPARATION:   Telemetry, pulse oximetry, blood pressure were monitored throughout the procedure  Patient was identified by myself both verbally and by visual inspection of ID band  DESCRIPTION:   Patient was placed in the left lateral decubitus position and was sedated with the above medication  The gastroscope was introduced in to the oropharynx and the esophagus was intubated under direct visualization  Scope was passed down the esophagus up to 2nd part of the duodenum  A careful inspection was made as the gastroscope was withdrawn, including a retroflexed view of the stomach; findings and interventions are described below  FINDINGS:    #1  Esophagus- the proximal, mid, distal esophagus appeared normal   The EG junction was located 40 cm from the incisors  There were 2 small, less than 3 mm fingers of metaplasia at the EG junction consistent with short-segment Le's esophagus  Four quadrant biopsies were obtained at the EG junction to rule out dysplasia  #2  Stomach- the cardia, fundus, body, antrum, and pylorus were normal in appearance  #3  Duodenum- the 1st and 2nd portion of the duodenum were normal          IMPRESSIONS:      1  Gastroesophageal reflux disease without esophagitis  2  Short-segment Le's esophagus, status post biopsies to rule out dysplasia  3  Otherwise normal esophagogastroduodenoscopy    RECOMMENDATIONS:     1  Follow up on the routine biopsies 1 weeks time  2   Repeat esophagogastroduodenoscopy in 3 years time for surveillance purposes  3  Continue current medical therapy as prescribed    COMPLICATIONS:  None; patient tolerated the procedure well      SPECIMENS:  4 biopsy specimens were obtained at the EG junction    ESTIMATED BLOOD LOSS:  Minimal

## 2019-03-08 NOTE — ANESTHESIA PREPROCEDURE EVALUATION
Review of Systems/Medical History  Patient summary reviewed  Chart reviewed  No history of anesthetic complications     Cardiovascular  Exercise tolerance (METS): >4,  Hyperlipidemia, Hypertension , No past MI , No CAD , No history of CABG, No cardiac stents  No history of percutaneous transluminal coronary angioplasty, No dysrhythmias , No angina ,    Pulmonary  Negative pulmonary ROS No asthma , No shortness of breath, No recent URI , No sleep apnea ,        GI/Hepatic    GERD , Esophageal disease sullivan esophagus, Liver disease ,   Comment: HELLER  Diverticulitis with microperf and abscess           Endo/Other     GYN       Hematology    Immunocompromised state ,    Musculoskeletal    Comment: SLE- polyarthritis, no organ involvement       Neurology  Negative neurology ROS      Psychology   Negative psychology ROS              Physical Exam    Airway  Comment: Pepe   Mallampati score: II  TM Distance: >3 FB  Neck ROM: full     Dental   No notable dental hx     Cardiovascular  Rhythm: regular, Rate: normal,     Pulmonary  Breath sounds clear to auscultation,     Other Findings        Anesthesia Plan  ASA Score- 3     Anesthesia Type- IV sedation with anesthesia with ASA Monitors  Additional Monitors:   Airway Plan:         Plan Factors-    Induction- intravenous  Postoperative Plan-     Informed Consent- Anesthetic plan and risks discussed with patient  I personally reviewed this patient with the CRNA  Discussed and agreed on the Anesthesia Plan with the CRNA  Derick Witt

## 2019-03-08 NOTE — DISCHARGE INSTRUCTIONS
Upper Endoscopy   WHAT YOU NEED TO KNOW:   An upper endoscopy is also called an upper gastrointestinal (GI) endoscopy, or an esophagogastroduodenoscopy (EGD)  You may feel bloated, gassy, or have some abdominal discomfort after your procedure  Your throat may be sore for 24 to 36 hours  You may burp or pass gas from air that is still inside your body  DISCHARGE INSTRUCTIONS:   Call 911 for any of the following:   · You have sudden chest pain or trouble breathing  Seek care immediately if:   · You feel dizzy or faint  · You have trouble swallowing  · Your bowel movements are very dark or black  · Your abdomen is hard and firm and you have severe pain  · You vomit blood  Contact your healthcare provider if:   · You feel full or bloated and cannot burp or pass gas  · You have not had a bowel movement for 3 days after your procedure  · You have neck pain  · You have a fever or chills  · You have nausea or are vomiting  · You have a rash or hives  · You have questions or concerns about your endoscopy  Relieve a sore throat:  Suck on throat lozenges or crushed ice  Gargle with a small amount of warm salt water  Mix 1 teaspoon of salt and 1 cup of warm water to make salt water  Relieve gas and discomfort from bloating:  Lie on your right side with a heating pad on your abdomen  Take short walks to help pass gas  Eat small meals until bloating is relieved  Rest after your procedure: You have been given medicine to relax you  Do not  drive or make important decisions until the day after your procedure  Return to your normal activity as directed  You can usually return to work the day after your procedure  Follow up with your healthcare provider as directed:  Write down your questions so you remember to ask them during your visits     © 2017 [address] is for End User's use only and may not be sold, redistributed or otherwise used for commercial purposes  All illustrations and images included in CareNotes® are the copyrighted property of A D A M , Inc  or [employer]  The above information is an  only  It is not intended as medical advice for individual conditions or treatments  Talk to your doctor, nurse or pharmacist before following any medical regimen to see if it is safe and effective for you

## 2019-09-05 ENCOUNTER — OFFICE VISIT (OUTPATIENT)
Dept: DERMATOLOGY | Facility: CLINIC | Age: 59
End: 2019-09-05
Payer: COMMERCIAL

## 2019-09-05 DIAGNOSIS — Z13.89 SCREENING FOR SKIN CONDITION: ICD-10-CM

## 2019-09-05 DIAGNOSIS — L82.1 SEBORRHEIC KERATOSIS: Primary | ICD-10-CM

## 2019-09-05 DIAGNOSIS — Z85.828 HISTORY OF SKIN CANCER: ICD-10-CM

## 2019-09-05 PROCEDURE — 99213 OFFICE O/P EST LOW 20 MIN: CPT | Performed by: DERMATOLOGY

## 2019-09-05 RX ORDER — CLOPIDOGREL BISULFATE 75 MG/1
75 TABLET ORAL DAILY
COMMUNITY
Start: 2019-04-24

## 2019-09-05 RX ORDER — FENOFIBRATE 145 MG/1
145 TABLET, COATED ORAL DAILY
COMMUNITY
Start: 2019-07-29

## 2019-09-05 NOTE — PROGRESS NOTES
[facility]MATOLOG[facility]  [place] [address] [place] [address]  [phone]  [phone]     MRN: [id number] : 1960  Encounter: [id number]  Patient Information: Ellis Ear III  Chief complaint: yearly check up    History of present illness:  76-year-old male presents for overall skin check previous history of skin cancer also with history of irritation on his lip we had discussed previously  Notes the areas bother him again  Past Medical History:   Diagnosis Date    Diverticulitis of colon     Dyskinesia     Fatty liver     Hypertension     Lupus ([address] )     Movement disorder      Past Surgical History:   Procedure Laterality Date    ANKLE SURGERY      COLONOSCOPY  2018    HAND SURGERY      left index surgery    KNEE SURGERY      bi lateral meniscus tears both x2    CO ESOPHAGOGASTRODUODENOSCOPY TRANSORAL DIAGNOSTIC N/A 3/8/2019    Procedure: ESOPHAGOGASTRODUODENOSCOPY (EGD); Surgeon: Artie Chauhan DO;  Location: [facility]; Service: Gastroenterology    SHOULDER SURGERY      SINUS SURGERY      TONSILLECTOMY       Social History   Social History     Substance and Sexual Activity   Alcohol Use Yes    Frequency: 2-4 times a month    Comment: social     Social History     Substance and Sexual Activity   Drug Use Never     Social History     Tobacco Use   Smoking Status Never Smoker   Smokeless Tobacco Never Used     Family History   Problem Relation Age of Onset    Hypertension Father      Meds/Allergies   Allergies   Allergen Reactions    Methyldopa      Other reaction(s): Unknown    Sertraline      tardivedyskinsia       Meds:  Prior to Admission medications    Medication Sig Start Date End Date Taking?  Authorizing Provider   amLODIPine (NORVASC) 10 mg tablet Take 10 mg by mouth daily   Yes Historical Provider, MD   Cholecalciferol (VITAMIN D3) 2000 units capsule Daily   Yes Historical Provider, MD   clonazePAM (KlonoPIN) 0 5 mg tablet 6/8/18  Yes Historical Provider, MD   clopidogrel (PLAVIX) 75 mg tablet Take 75 mg by mouth daily 4/24/19  Yes Historical Provider, MD   DEXILANT 60 MG capsule Take 1 capsule (60 mg total) by mouth daily 2/11/19  Yes Kym Salinas DO   fenofibrate (TRICOR) 145 mg tablet Take 145 mg by mouth daily 7/29/19  Yes Historical Provider, MD   hydroxychloroquine (PLAQUENIL) 200 mg tablet Take 200 mg by mouth 2 (two) times a day with meals   Yes Historical Provider, MD   Methylcellulose, Laxative, (CITRUCEL PO) Take 2 tablets by mouth daily   Yes Historical Provider, MD   Multiple Vitamins-Minerals (MULTIVITAMIN ADULT EXTRA C PO) multivitamin   Yes Historical Provider, MD   Probiotic Product (PROBIOTIC-10 PO) Daily   Yes Historical Provider, MD   Propranolol HCl (INDERAL PO) Take 80 mg by mouth   Yes Historical Provider, MD   aspirin 81 MG tablet Daily    Historical Provider, MD   betamethasone dipropionate (DIPROSONE) 0 05 % ointment Apply topically 2 (two) times a day 8/22/18 8/22/19  Historical Provider, MD   betamethasone, augmented, (DIPROLENE) 0 05 % ointment Apply topically 2 (two) times a day To hands and feet  Patient not taking: Reported on 9/5/2019 8/24/18   Joaquín Ngo MD   Butalbital-APAP-Caffeine -40 MG CAPS TAKE ONE CAPSULE BY MOUTH EVERY 4 HOURS    Historical Provider, MD   Cholecalciferol 73133 units CAPS Take 2,000 Units by mouth    Historical Provider, MD   metroNIDAZOLE (FLAGYL) 500 mg tablet Take 500 mg by mouth every 8 (eight) hours    Historical Provider, MD   Omega-3 Fatty Acids (FISH OIL) 1,000 mg Every 12 hours    Historical Provider, MD   sulfamethoxazole-trimethoprim (BACTRIM) 400-80 mg per tablet Take 2 tablets by mouth every 12 (twelve) hours    Historical Provider, MD       Subjective:     Review of Systems:    General: negative for - chills, fatigue, fever,  weight gain or weight loss  Psychological: negative for - anxiety, behavioral disorder, concentration difficulties, decreased libido, depression, irritability, memory difficulties, mood swings, sleep disturbances or suicidal ideation  ENT: negative for - hearing difficulties , nasal congestion, nasal discharge, oral lesions, sinus pain, sneezing, sore throat  Allergy and Immunology: negative for - hives, insect bite sensitivity,  Hematological and Lymphatic: negative for - bleeding problems, blood clots,bruising, swollen lymph nodes  Endocrine: negative for - hair pattern changes, hot flashes, malaise/lethargy, mood swings, palpitations, polydipsia/polyuria, skin changes, temperature intolerance or unexpected weight change  Respiratory: negative for - cough, hemoptysis, orthopnea, shortness of breath, or wheezing  Cardiovascular: negative for - chest pain, dyspnea on exertion, edema,  Gastrointestinal: negative for - abdominal pain, nausea/vomiting  Genito-Urinary: negative for - dysuria, incontinence, irregular/heavy menses or urinary frequency/urgency  Musculoskeletal: negative for - gait disturbance, joint pain, joint stiffness, joint swelling, muscle pain, muscular weakness  Dermatological:  As in HPI  Neurological: negative for confusion, dizziness, headaches, impaired coordination/balance, memory loss, numbness/tingling, seizures, speech problems, tremors or weakness       Objective: There were no vitals taken for this visit      Physical Exam:    General Appearance:    Alert, cooperative, no distress   Head:    Normocephalic, without obvious abnormality, atraumatic           Skin:   A full skin exam was performed including scalp, head scalp, eyes, ears, nose, lips, neck, chest, axilla, abdomen, back, buttocks, bilateral upper extremities, bilateral lower extremities, hands, feet, fingers, toes, fingernails, and toenails no evidence of any skin lesion on lip at this time normal keratotic papules greasy stuck appearance nothing else remarkable noted on complete exam no sign of any active psoriasis with seborrheic this point Assessment:     1  Seborrheic keratosis     2  Screening for skin condition     3  History of skin cancer           Plan:   Seborrheic keratosis patient reassured these are normal growths we acquire with age no treatment needed  History of skin cancer in no recurrence nothing else atypical sunblock recommended follow-up in 1 year  Screening for dermatologic disorders nothing else of concern noted on complete exam follow-up in 1 year      Rene Garcia MD  2/4/1327,0:94 PM    Portions of the record may have been created with voice recognition software   Occasional wrong word or "sound a like" substitutions may have occurred due to the inherent limitations of voice recognition software   Read the chart carefully and recognize, using context, where substitutions have occurred

## 2020-03-13 DIAGNOSIS — K21.9 GASTROESOPHAGEAL REFLUX DISEASE WITHOUT ESOPHAGITIS: ICD-10-CM

## 2020-03-13 NOTE — TELEPHONE ENCOUNTER
Dr Socrates Marquez - Patient called refill Dexilant 60 MG capsule 1 capsule daily   Please call [facility] at [phone] Any questions please call [facility] at [phone] ty

## 2020-03-16 ENCOUNTER — TELEPHONE (OUTPATIENT)
Dept: GASTROENTEROLOGY | Facility: CLINIC | Age: 60
End: 2020-03-16

## 2020-03-16 DIAGNOSIS — K21.9 GASTROESOPHAGEAL REFLUX DISEASE WITHOUT ESOPHAGITIS: ICD-10-CM

## 2020-03-16 NOTE — TELEPHONE ENCOUNTER
Called [facility] and they confirmed Tee Pacheco is ready for   Called pt and LMOM advising med is ready for

## 2020-03-16 NOTE — TELEPHONE ENCOUNTER
[place] pt-  RX: Dexilant 60 mg/ 90 qty     Uses: [phone]  Please phone patient @ [phone] when RX has been ordered     He has no medication     [phone]

## 2020-09-15 ENCOUNTER — OFFICE VISIT (OUTPATIENT)
Dept: DERMATOLOGY | Facility: CLINIC | Age: 60
End: 2020-09-15
Payer: COMMERCIAL

## 2020-09-15 VITALS — TEMPERATURE: 97.4 F

## 2020-09-15 DIAGNOSIS — L91.8 SKIN TAG: Primary | ICD-10-CM

## 2020-09-15 DIAGNOSIS — Z13.89 SCREENING FOR SKIN CONDITION: ICD-10-CM

## 2020-09-15 DIAGNOSIS — L82.1 SEBORRHEIC KERATOSIS: ICD-10-CM

## 2020-09-15 DIAGNOSIS — Z85.828 HISTORY OF SKIN CANCER: ICD-10-CM

## 2020-09-15 DIAGNOSIS — L40.9 PSORIASIS: ICD-10-CM

## 2020-09-15 PROCEDURE — 99213 OFFICE O/P EST LOW 20 MIN: CPT | Performed by: DERMATOLOGY

## 2020-09-15 NOTE — PROGRESS NOTES
[address]  [address] [facility] [address]  [phone]  [phone]     MRN: [id number] : 1960  Encounter: [id number]  Patient Information: Ramon Yan III  Chief complaint:  Yearly  Checkup    History of present illness:  43-year-old male with known history of lupus history of psoriasis presents for overall checkup concerned regarding some growths on his right upper eyelid no other concerns noted  Past Medical History:   Diagnosis Date    Diverticulitis of colon     Dyskinesia     Fatty liver     Hypertension     Lupus ([address] )     Movement disorder      Past Surgical History:   Procedure Laterality Date    ANKLE SURGERY      COLONOSCOPY  2018    HAND SURGERY      left index surgery    KNEE SURGERY      bi lateral meniscus tears both x2    [place] ESOPHAGOGASTRODUODENOSCOPY TRANSORAL DIAGNOSTIC N/A 3/8/2019    Procedure: ESOPHAGOGASTRODUODENOSCOPY (EGD); Surgeon: Batsheva Marquez DO;  Location: [facility]; Service: Gastroenterology    SHOULDER SURGERY      SINUS SURGERY      TONSILLECTOMY       Social History   Social History     Substance and Sexual Activity   Alcohol Use Yes    Frequency: 2-4 times a month    Comment: social     Social History     Substance and Sexual Activity   Drug Use Never     Social History     Tobacco Use   Smoking Status Never Smoker   Smokeless Tobacco Never Used     Family History   Problem Relation Age of Onset    Hypertension Father      Meds/Allergies   Allergies   Allergen Reactions    Methyldopa      Other reaction(s): Unknown    Sertraline      tardivedyskinsia       Meds:  Prior to Admission medications    Medication Sig Start Date End Date Taking?  Authorizing Provider   amLODIPine (NORVASC) 10 mg tablet Take 10 mg by mouth daily   Yes Historical Provider, MD   aspirin 81 MG tablet Daily   Yes Historical Provider, MD   Butalbital-APAP-Caffeine -40 MG CAPS TAKE ONE CAPSULE BY MOUTH EVERY 4 HOURS   Yes Historical Provider, MD   Cholecalciferol (VITAMIN D3) 2000 units capsule Daily   Yes Historical Provider, MD   Cholecalciferol 38587 units CAPS Take 2,000 Units by mouth   Yes Historical Provider, MD   clonazePAM (KlonoPIN) 0 5 mg tablet  6/8/18  Yes Historical Provider, MD   clopidogrel (PLAVIX) 75 mg tablet Take 75 mg by mouth daily 4/24/19  Yes Historical Provider, MD   DEXILANT 60 MG capsule Take 1 capsule (60 mg total) by mouth daily 3/13/20  Yes Floresita Camarena PA-C   fenofibrate (TRICOR) 145 mg tablet Take 145 mg by mouth daily 7/29/19  Yes Historical Provider, MD   hydroxychloroquine (PLAQUENIL) 200 mg tablet Take 200 mg by mouth 2 (two) times a day with meals   Yes Historical Provider, MD   Methylcellulose, Laxative, (CITRUCEL PO) Take 2 tablets by mouth daily   Yes Historical Provider, MD   metroNIDAZOLE (FLAGYL) 500 mg tablet Take 500 mg by mouth every 8 (eight) hours   Yes Historical Provider, MD   Multiple Vitamins-Minerals (MULTIVITAMIN ADULT EXTRA C PO) multivitamin   Yes Historical Provider, MD   Omega-3 Fatty Acids (FISH OIL) 1,000 mg Every 12 hours   Yes Historical Provider, MD   Probiotic Product (PROBIOTIC-10 PO) Daily   Yes Historical Provider, MD   Propranolol HCl (INDERAL PO) Take 80 mg by mouth   Yes Historical Provider, MD   sulfamethoxazole-trimethoprim (BACTRIM) 400-80 mg per tablet Take 2 tablets by mouth every 12 (twelve) hours   Yes Historical Provider, MD   betamethasone dipropionate (DIPROSONE) 0 05 % ointment Apply topically 2 (two) times a day 8/22/18 8/22/19  Historical Provider, MD   betamethasone, augmented, (DIPROLENE) 0 05 % ointment Apply topically 2 (two) times a day To hands and feet  Patient not taking: Reported on 9/5/2019 8/24/18   Odalis Simpson MD       Subjective:     Review of Systems:    General: negative for - chills, fatigue, fever,  weight gain or weight loss  Psychological: negative for - anxiety, behavioral disorder, concentration difficulties, decreased libido, depression, irritability, memory difficulties, mood swings, sleep disturbances or suicidal ideation  ENT: negative for - hearing difficulties , nasal congestion, nasal discharge, oral lesions, sinus pain, sneezing, sore throat  Allergy and Immunology: negative for - hives, insect bite sensitivity,  Hematological and Lymphatic: negative for - bleeding problems, blood clots,bruising, swollen lymph nodes  Endocrine: negative for - hair pattern changes, hot flashes, malaise/lethargy, mood swings, palpitations, polydipsia/polyuria, skin changes, temperature intolerance or unexpected weight change  Respiratory: negative for - cough, hemoptysis, orthopnea, shortness of breath, or wheezing  Cardiovascular: negative for - chest pain, dyspnea on exertion, edema,  Gastrointestinal: negative for - abdominal pain, nausea/vomiting  Genito-Urinary: negative for - dysuria, incontinence, irregular/heavy menses or urinary frequency/urgency  Musculoskeletal: negative for - gait disturbance, joint pain, joint stiffness, joint swelling, muscle pain, muscular weakness  Dermatological:  As in HPI  Neurological: negative for confusion, dizziness, headaches, impaired coordination/balance, memory loss, numbness/tingling, seizures, speech problems, tremors or weakness       Objective:   Temp (!) 97 4 °F (36 3 °C)     Physical Exam:    General Appearance:    Alert, cooperative, no distress   Head:    Normocephalic, without obvious abnormality, atraumatic           Skin:   A full skin exam was performed including scalp, head scalp, eyes, ears, nose, lips, neck, chest, axilla, abdomen, back, buttocks, bilateral upper extremities, bilateral lower extremities, hands, feet, fingers, toes, fingernails, and toenails scaling erythematous well-demarcated patches noted on the palmar surface the base of his thumbs also on his heels fleshy pedunculated papules noted on the eyelid x3 nothing else remarkable noted on complete exam normal keratotic papules greasy stuck appearance previous sites skin cancer well healed without recurrence       Assessment:     1  Skin tag     2  Seborrheic keratosis     3  Psoriasis     4  History of skin cancer     5  Screening for skin condition           Plan:   Skin tags advised patient that these are normal growths that we acquire sometimes related to diabetes and weight as well as genetics can be removed but considered cosmetic  Psoriasis slightly active continue topical steroid as needed  Seborrheic keratosis patient reassured these are normal growths we acquire with age no treatment needed  History of skin cancer in no recurrence nothing else atypical sunblock recommended follow-up in 1 year  Screening for dermatologic disorders nothing else of concern noted on complete exam follow-up in 1 year      Carlyn Tellez MD  9/15/2020,4:28 PM    Portions of the record may have been created with voice recognition software   Occasional wrong word or "sound a like" substitutions may have occurred due to the inherent limitations of voice recognition software   Read the chart carefully and recognize, using context, where substitutions have occurred

## 2020-09-15 NOTE — PATIENT INSTRUCTIONS
Skin tags advised patient that these are normal growths that we acquire sometimes related to diabetes and weight as well as genetics can be removed but considered cosmetic  Psoriasis slightly active continue topical steroid as needed  Seborrheic keratosis patient reassured these are normal growths we acquire with age no treatment needed  History of skin cancer in no recurrence nothing else atypical sunblock recommended follow-up in 1 year  Screening for dermatologic disorders nothing else of concern noted on complete exam follow-up in 1 year

## 2021-02-02 DIAGNOSIS — K21.9 GASTROESOPHAGEAL REFLUX DISEASE WITHOUT ESOPHAGITIS: ICD-10-CM

## 2021-09-22 ENCOUNTER — OFFICE VISIT (OUTPATIENT)
Dept: DERMATOLOGY | Facility: CLINIC | Age: 61
End: 2021-09-22
Payer: COMMERCIAL

## 2021-09-22 DIAGNOSIS — L57.0 ACTINIC KERATOSIS: Primary | ICD-10-CM

## 2021-09-22 DIAGNOSIS — Z85.828 HISTORY OF SKIN CANCER: ICD-10-CM

## 2021-09-22 DIAGNOSIS — Z13.89 SCREENING FOR SKIN CONDITION: ICD-10-CM

## 2021-09-22 DIAGNOSIS — L82.1 SEBORRHEIC KERATOSIS: ICD-10-CM

## 2021-09-22 DIAGNOSIS — L40.9 PSORIASIS: ICD-10-CM

## 2021-09-22 PROCEDURE — 17000 DESTRUCT PREMALG LESION: CPT | Performed by: DERMATOLOGY

## 2021-09-22 PROCEDURE — 99213 OFFICE O/P EST LOW 20 MIN: CPT | Performed by: DERMATOLOGY

## 2021-09-22 NOTE — PROGRESS NOTES
[address] [facility] DERMATOLOGY  [address] [place] [address]  [facility] [address]  [phone]  [phone]     MRN: [id number] : 1960  Encounter: [id number]  Patient Information: Xavier Snow III  Chief complaint:  Yearly Checkup lesion on the nose    History of present illness:  43-year-old male with previous history of some psoriasis and history of skin cancer presents for overall checkup concerned regarding a spot that scaling on the nose no other concerns noted this time  Past Medical History:   Diagnosis Date    Diverticulitis of colon     Dyskinesia     Fatty liver     Hypertension     Lupus ([address] )     Movement disorder      Past Surgical History:   Procedure Laterality Date    ANKLE SURGERY      COLONOSCOPY  2018    HAND SURGERY      left index surgery    KNEE SURGERY      bi lateral meniscus tears both x2    [place] ESOPHAGOGASTRODUODENOSCOPY TRANSORAL DIAGNOSTIC N/A 3/8/2019    Procedure: ESOPHAGOGASTRODUODENOSCOPY (EGD); Surgeon: Galina Shelley DO;  Location: [facility]; Service: Gastroenterology    SHOULDER SURGERY      SINUS SURGERY      TONSILLECTOMY       Social History   Social History     Substance and Sexual Activity   Alcohol Use Yes    Comment: social     Social History     Substance and Sexual Activity   Drug Use Never     Social History     Tobacco Use   Smoking Status Never Smoker   Smokeless Tobacco Never Used     Family History   Problem Relation Age of Onset    Hypertension Father      Meds/Allergies   Allergies   Allergen Reactions    Methyldopa      Other reaction(s): Unknown    Sertraline      tardivedyskinsia       Meds:  Prior to Admission medications    Medication Sig Start Date End Date Taking?  Authorizing Provider   amLODIPine (NORVASC) 10 mg tablet Take 10 mg by mouth daily   Yes Historical Provider, MD   aspirin 81 MG tablet Daily   Yes Historical Provider, MD   Butalbital-APAP-Caffeine -40 MG CAPS TAKE ONE CAPSULE BY MOUTH EVERY 4 HOURS   Yes Historical Provider, MD   Cholecalciferol (VITAMIN D3) 2000 units capsule Daily   Yes Historical Provider, MD   Cholecalciferol 42503 units CAPS Take 2,000 Units by mouth   Yes Historical Provider, MD   clonazePAM (KlonoPIN) 0 5 mg tablet  6/8/18  Yes Historical Provider, MD   clopidogrel (PLAVIX) 75 mg tablet Take 75 mg by mouth daily 4/24/19  Yes Historical Provider, MD   Dexilant 60 MG capsule TAKE 1 CAPSULE BY MOUTH EVERY DAY 2/3/21  Yes Clocal Shin PA-C   fenofibrate (TRICOR) 145 mg tablet Take 145 mg by mouth daily 7/29/19  Yes Historical Provider, MD   hydroxychloroquine (PLAQUENIL) 200 mg tablet Take 200 mg by mouth 2 (two) times a day with meals   Yes Historical Provider, MD   Methylcellulose, Laxative, (CITRUCEL PO) Take 2 tablets by mouth daily   Yes Historical Provider, MD   metroNIDAZOLE (FLAGYL) 500 mg tablet Take 500 mg by mouth every 8 (eight) hours   Yes Historical Provider, MD   Multiple Vitamins-Minerals (MULTIVITAMIN ADULT EXTRA C PO) multivitamin   Yes Historical Provider, MD   Omega-3 Fatty Acids (FISH OIL) 1,000 mg Every 12 hours   Yes Historical Provider, MD   Probiotic Product (PROBIOTIC-10 PO) Daily   Yes Historical Provider, MD   Propranolol HCl (INDERAL PO) Take 80 mg by mouth   Yes Historical Provider, MD   sulfamethoxazole-trimethoprim (BACTRIM) 400-80 mg per tablet Take 2 tablets by mouth every 12 (twelve) hours   Yes Historical Provider, MD   betamethasone dipropionate (DIPROSONE) 0 05 % ointment Apply topically 2 (two) times a day 8/22/18 8/22/19  Historical Provider, MD   betamethasone, augmented, (DIPROLENE) 0 05 % ointment Apply topically 2 (two) times a day To hands and feet  Patient not taking: Reported on 9/5/2019 8/24/18   Uzma Dos Santos MD       Subjective:     Review of Systems:    General: negative for - chills, fatigue, fever,  weight gain or weight loss  Psychological: negative for - anxiety, behavioral disorder, concentration difficulties, decreased libido, depression, irritability, memory difficulties, mood swings, sleep disturbances or suicidal ideation  ENT: negative for - hearing difficulties , nasal congestion, nasal discharge, oral lesions, sinus pain, sneezing, sore throat  Allergy and Immunology: negative for - hives, insect bite sensitivity,  Hematological and Lymphatic: negative for - bleeding problems, blood clots,bruising, swollen lymph nodes  Endocrine: negative for - hair pattern changes, hot flashes, malaise/lethargy, mood swings, palpitations, polydipsia/polyuria, skin changes, temperature intolerance or unexpected weight change  Respiratory: negative for - cough, hemoptysis, orthopnea, shortness of breath, or wheezing  Cardiovascular: negative for - chest pain, dyspnea on exertion, edema,  Gastrointestinal: negative for - abdominal pain, nausea/vomiting  Genito-Urinary: negative for - dysuria, incontinence, irregular/heavy menses or urinary frequency/urgency  Musculoskeletal: negative for - gait disturbance, joint pain, joint stiffness, joint swelling, muscle pain, muscular weakness  Dermatological:  As in HPI  Neurological: negative for confusion, dizziness, headaches, impaired coordination/balance, memory loss, numbness/tingling, seizures, speech problems, tremors or weakness       Objective: There were no vitals taken for this visit      Physical Exam:    General Appearance:    Alert, cooperative, no distress   Head:    Normocephalic, without obvious abnormality, atraumatic           Skin:   A full skin exam was performed including scalp, head scalp, eyes, ears, nose, lips, neck, chest, axilla, abdomen, back, buttocks, bilateral upper extremities, bilateral lower extremities, hands, feet, fingers, toes, fingernails, and toenails scaling areas noted below normal keratotic papules greasy stuck appearance no active psoriasis noted nothing else of concern noted on complete exam previous sites skin cancer well healed without recurrence  Cryotherapy Procedure Note    Pre-operative Diagnosis: actinic keratosis    Plan:  1  Instructed to keep the area dry and clean thereafter  Apply petrolatum if area gets crusty  2  Recommended that the patient use acetaminophen  as needed for pain  Locations: nose    Indications: Destruction of actinic keratosis x 1    Patient informed of risks (permanent scarring, infection, light or dark discoloration, bleeding, infection, weakness, numbness and recurrence of the lesion) and benefits of the procedure and verbal informed consent obtained  The areas are treated with liquid nitrogen therapy, frozen until ice ball extended 2 mm beyond lesion, allowed to thaw, and treated again  The patient tolerated procedure well  The patient was instructed on post-op care, warned that there may be blister formation, redness and pain  Recommend OTC analgesia as needed for pain  Condition:  Stable    Complications:  none  Assessment:     1  Actinic keratosis     2  Psoriasis     3  Seborrheic keratosis     4  History of skin cancer     5  Screening for skin condition           Plan:   Actinic Keratosis:  Patient advised lesions are precancers  These should resolve with cryosurgery if not to let us know sun block recommended    Minimal psoriasis no active lesions noted this time  Seborrheic keratosis patient reassured these are normal growths we acquire with age no treatment needed  History of skin cancer in no recurrence nothing else atypical sunblock recommended follow-up in 1 year  Screening for dermatologic disorders nothing else of concern noted on complete exam follow-up in 1 year      Tsering Hudson MD  9/22/2021,4:03 PM    Portions of the record may have been created with voice recognition software   Occasional wrong word or "sound a like" substitutions may have occurred due to the inherent limitations of voice recognition software   Read the chart carefully and recognize, using context, where substitutions have occurred

## 2021-09-22 NOTE — PATIENT INSTRUCTIONS
Actinic Keratosis:  Patient advised lesions are precancers  These should resolve with cryosurgery if not to let us know sun block recommended  Minimal psoriasis no active lesions noted this time  Seborrheic keratosis patient reassured these are normal growths we acquire with age no treatment needed  History of skin cancer in no recurrence nothing else atypical sunblock recommended follow-up in 1 year  Screening for dermatologic disorders nothing else of concern noted on complete exam follow-up in 1 year  Treatment with Cryotherapy    The doctor has treated your skin with nitrogen, which is 320 degrees Fahrenheit below zero  He has given the treated area "frostbite "    Stinging should subside within a few hours  You can take Tylenol for pain, if needed  Over the next few days, it is normal if the area becomes reddened, a blood blister, or swollen with fluid  If the lesion treated was near the eye - you could get a swollen eye over the next few days  Do not panic! This is all temporary, and will resolve with time  There is no special treatment - just keep the area clean  Makeup and BandAids can be used, if preferred  When the area starts to dry up and peel off, using Vaseline can help healing  It usually takes up to a month for it to heal   Some lesions are recurrent and may require repeat treatments  If a lesion has not healed in one month, please don't hesitate to contact us  If you have any further questions that are not answered here, please call us  [phone]    Thank you for allowing us to care for you

## 2021-10-25 ENCOUNTER — TELEPHONE (OUTPATIENT)
Dept: GASTROENTEROLOGY | Facility: CLINIC | Age: 61
End: 2021-10-25

## 2021-11-16 RX ORDER — IBUPROFEN 800 MG/1
TABLET ORAL
COMMUNITY
Start: 2021-03-29 | End: 2022-03-30

## 2021-11-16 RX ORDER — ACETAMINOPHEN 325 MG/1
TABLET ORAL
COMMUNITY
Start: 2021-03-29 | End: 2022-03-30

## 2021-11-16 RX ORDER — ATORVASTATIN CALCIUM 10 MG/1
TABLET, FILM COATED ORAL
COMMUNITY

## 2021-11-16 RX ORDER — TRAMADOL HYDROCHLORIDE 50 MG/1
TABLET ORAL
COMMUNITY
Start: 2021-03-29

## 2021-11-16 RX ORDER — DICLOFENAC SODIUM 20 MG/G
SOLUTION TOPICAL
COMMUNITY

## 2021-11-17 ENCOUNTER — OFFICE VISIT (OUTPATIENT)
Dept: GASTROENTEROLOGY | Facility: CLINIC | Age: 61
End: 2021-11-17
Payer: COMMERCIAL

## 2021-11-17 VITALS
DIASTOLIC BLOOD PRESSURE: 90 MMHG | WEIGHT: 260 LBS | HEART RATE: 77 BPM | BODY MASS INDEX: 35.21 KG/M2 | HEIGHT: 72 IN | SYSTOLIC BLOOD PRESSURE: 138 MMHG

## 2021-11-17 DIAGNOSIS — K22.70 BARRETT'S ESOPHAGUS WITHOUT DYSPLASIA: ICD-10-CM

## 2021-11-17 DIAGNOSIS — Z86.010 HISTORY OF COLON POLYPS: ICD-10-CM

## 2021-11-17 DIAGNOSIS — K21.9 GASTROESOPHAGEAL REFLUX DISEASE WITHOUT ESOPHAGITIS: Primary | ICD-10-CM

## 2021-11-17 PROCEDURE — 99203 OFFICE O/P NEW LOW 30 MIN: CPT | Performed by: PHYSICIAN ASSISTANT

## 2021-11-17 RX ORDER — TADALAFIL 20 MG/1
TABLET ORAL
COMMUNITY
Start: 2021-10-11

## 2021-12-28 ENCOUNTER — TELEPHONE (OUTPATIENT)
Dept: GASTROENTEROLOGY | Facility: CLINIC | Age: 61
End: 2021-12-28

## 2022-01-31 ENCOUNTER — TELEPHONE (OUTPATIENT)
Dept: GASTROENTEROLOGY | Facility: HOSPITAL | Age: 62
End: 2022-01-31

## 2022-02-01 ENCOUNTER — HOSPITAL ENCOUNTER (OUTPATIENT)
Dept: GASTROENTEROLOGY | Facility: HOSPITAL | Age: 62
Setting detail: OUTPATIENT SURGERY
Discharge: HOME | End: 2022-02-01
Attending: INTERNAL MEDICINE | Admitting: INTERNAL MEDICINE
Payer: COMMERCIAL

## 2022-02-01 ENCOUNTER — ANESTHESIA EVENT (OUTPATIENT)
Dept: GASTROENTEROLOGY | Facility: HOSPITAL | Age: 62
End: 2022-02-01

## 2022-02-01 ENCOUNTER — ANESTHESIA (OUTPATIENT)
Dept: GASTROENTEROLOGY | Facility: HOSPITAL | Age: 62
End: 2022-02-01

## 2022-02-01 VITALS
HEIGHT: 71 IN | DIASTOLIC BLOOD PRESSURE: 99 MMHG | SYSTOLIC BLOOD PRESSURE: 155 MMHG | HEART RATE: 64 BPM | RESPIRATION RATE: 18 BRPM | OXYGEN SATURATION: 95 % | WEIGHT: 256.84 LBS | BODY MASS INDEX: 35.96 KG/M2 | TEMPERATURE: 97.7 F

## 2022-02-01 DIAGNOSIS — Z86.010 HISTORY OF COLON POLYPS: ICD-10-CM

## 2022-02-01 DIAGNOSIS — K22.70 BARRETT'S ESOPHAGUS WITHOUT DYSPLASIA: ICD-10-CM

## 2022-02-01 DIAGNOSIS — K21.9 GASTROESOPHAGEAL REFLUX DISEASE WITHOUT ESOPHAGITIS: ICD-10-CM

## 2022-02-01 PROBLEM — E66.9 OBESITY: Status: ACTIVE | Noted: 2022-02-01

## 2022-02-01 PROBLEM — M32.9 LUPUS (HCC): Status: ACTIVE | Noted: 2022-02-01

## 2022-02-01 PROBLEM — G45.9 TIA (TRANSIENT ISCHEMIC ATTACK): Status: ACTIVE | Noted: 2019-07-08

## 2022-02-01 PROBLEM — E78.2 MIXED HYPERLIPIDEMIA: Status: ACTIVE | Noted: 2017-11-18

## 2022-02-01 PROBLEM — F41.1 GENERALIZED ANXIETY DISORDER: Status: ACTIVE | Noted: 2022-02-01

## 2022-02-01 PROBLEM — IMO0002 LUPUS: Status: ACTIVE | Noted: 2022-02-01

## 2022-02-01 PROCEDURE — 88305 TISSUE EXAM BY PATHOLOGIST: CPT | Performed by: PATHOLOGY

## 2022-02-01 PROCEDURE — 45385 COLONOSCOPY W/LESION REMOVAL: CPT | Performed by: INTERNAL MEDICINE

## 2022-02-01 PROCEDURE — 43239 EGD BIOPSY SINGLE/MULTIPLE: CPT | Performed by: INTERNAL MEDICINE

## 2022-02-01 RX ORDER — PROPOFOL 10 MG/ML
INJECTION, EMULSION INTRAVENOUS AS NEEDED
Status: DISCONTINUED | OUTPATIENT
Start: 2022-02-01 | End: 2022-02-01

## 2022-02-01 RX ORDER — SODIUM CHLORIDE, SODIUM LACTATE, POTASSIUM CHLORIDE, CALCIUM CHLORIDE 600; 310; 30; 20 MG/100ML; MG/100ML; MG/100ML; MG/100ML
INJECTION, SOLUTION INTRAVENOUS CONTINUOUS PRN
Status: DISCONTINUED | OUTPATIENT
Start: 2022-02-01 | End: 2022-02-01

## 2022-02-01 RX ORDER — LIDOCAINE HYDROCHLORIDE 20 MG/ML
INJECTION, SOLUTION EPIDURAL; INFILTRATION; INTRACAUDAL; PERINEURAL AS NEEDED
Status: DISCONTINUED | OUTPATIENT
Start: 2022-02-01 | End: 2022-02-01

## 2022-02-01 RX ORDER — SODIUM CHLORIDE, SODIUM LACTATE, POTASSIUM CHLORIDE, CALCIUM CHLORIDE 600; 310; 30; 20 MG/100ML; MG/100ML; MG/100ML; MG/100ML
125 INJECTION, SOLUTION INTRAVENOUS CONTINUOUS
Status: DISCONTINUED | OUTPATIENT
Start: 2022-02-01 | End: 2022-02-05 | Stop reason: HOSPADM

## 2022-02-01 RX ADMIN — SODIUM CHLORIDE, SODIUM LACTATE, POTASSIUM CHLORIDE, AND CALCIUM CHLORIDE: .6; .31; .03; .02 INJECTION, SOLUTION INTRAVENOUS at 07:44

## 2022-02-01 RX ADMIN — LIDOCAINE HYDROCHLORIDE 5 ML: 20 INJECTION, SOLUTION EPIDURAL; INFILTRATION; INTRACAUDAL; PERINEURAL at 07:57

## 2022-02-01 RX ADMIN — PROPOFOL 30 MG: 10 INJECTION, EMULSION INTRAVENOUS at 08:05

## 2022-02-01 RX ADMIN — PROPOFOL 50 MG: 10 INJECTION, EMULSION INTRAVENOUS at 08:11

## 2022-02-01 RX ADMIN — PROPOFOL 200 MG: 10 INJECTION, EMULSION INTRAVENOUS at 07:58

## 2022-02-01 NOTE — H&P
History and Physical - [facility]  John Juvenal III 62 y o  male MRN: [id number]  The    HPI: Leydi Wang is a 62y o  year old male who presents for gastroesophageal reflux disease, Le's esophagus, history of polyps      REVIEW OF SYSTEMS: Per the HPI, and otherwise unremarkable  Historical Information   Past Medical History:   Diagnosis Date    Diverticulitis of colon     Dyskinesia     Fatty liver     Hypertension     Lupus ([address]     Movement disorder     Stroke [facility])      Past Surgical History:   Procedure Laterality Date    ANKLE SURGERY      COLONOSCOPY  2018    HAND SURGERY      left index surgery    KNEE SURGERY      bi lateral meniscus tears both x2    [place] ESOPHAGOGASTRODUODENOSCOPY TRANSORAL DIAGNOSTIC N/A 3/8/2019    Procedure: ESOPHAGOGASTRODUODENOSCOPY (EGD); Surgeon: Yesenia Akhtar DO;  Location: [facility]; Service: Gastroenterology    SHOULDER SURGERY      SINUS SURGERY      TONSILLECTOMY       Social History   Social History     Substance and Sexual Activity   Alcohol Use Yes    Comment: social     Social History     Substance and Sexual Activity   Drug Use Never     Social History     Tobacco Use   Smoking Status Never Smoker   Smokeless Tobacco Never Used     Family History   Problem Relation Age of Onset    Hypertension Father        Meds/Allergies     (Not in a hospital admission)      Allergies   Allergen Reactions    Methyldopa      Other reaction(s): Unknown    Sertraline      tardivedyskinsia       Objective     Blood pressure (!) 168/106, pulse 87, temperature 97 6 °F (36 4 °C), temperature source Temporal, resp  rate 20, height 5' 11" (1 803 m), weight 116 kg (256 lb 13 4 oz), SpO2 97 %  PHYSICAL EXAM    Gen: NAD  CV: RRR  CHEST: Clear  ABD: soft, NT/ND  EXT: no edema      ASSESSMENT/PLAN:  This is a 62y o  year old male here for EGD with biopsies, colonoscopy, and he is stable and optimized for his procedure

## 2022-02-01 NOTE — ANESTHESIA PREPROCEDURE EVALUATION
A Stent [employer] 3mm 32mm Radopq 1 Acc Port Infl was deployed in the left anterior descending artery.   The stent was deployed at 12 junior for 10 seconds at 4/26/2021 12:17 PM . Stent balloon used for 2nd inflation at 12 junior for 7 seconds.    Procedure:  COLONOSCOPY  EGD    Relevant Problems   CARDIO   (+) Essential hypertension   (+) Migraine with aura   (+) Mixed hyperlipidemia      GI/HEPATIC   (+) Gastro-esophageal reflux disease without esophagitis      NEURO/PSYCH   (+) Generalized anxiety disorder   (+) History of skin cancer   (+) Migraine with aura   (+) TIA (transient ischemic attack)      Other   (+) Le's esophagus without dysplasia   (+) Lupus (HCC)   (+) Obesity   (+) Psoriasis      On Plavix, TIA 4 yrs ago  Plan for sleep study, endorses snoring  No recent systemic steroids  Physical Exam    Airway    Mallampati score: III  TM Distance: >3 FB  Neck ROM: full     Dental   Comment: Denies loose teeth,     Cardiovascular  Cardiovascular exam normal    Pulmonary  Pulmonary exam normal     Other Findings  Portions of exam deferred due to low yield and/or unknown COVID status  Large neck circumference      Anesthesia Plan  ASA Score- 3     Anesthesia Type- IV sedation with anesthesia with ASA Monitors  Additional Monitors:   Airway Plan:           Plan Factors-Exercise tolerance (METS): >4 METS  Chart reviewed  Existing labs reviewed  Patient summary reviewed  Patient is not a current smoker  Induction- intravenous  Postoperative Plan-     Informed Consent- Anesthetic plan and risks discussed with patient  I personally reviewed this patient with the CRNA  Discussed and agreed on the Anesthesia Plan with the CRNA  Jeri Mcdermott

## 2022-02-01 NOTE — ANESTHESIA POSTPROCEDURE EVALUATION
Post-Op Assessment Note    CV Status:  Stable    Pain management: adequate     Mental Status:  Alert and awake   Hydration Status:  Euvolemic   PONV Controlled:  Controlled   Airway Patency:  Patent      Post Op Vitals Reviewed: Yes            No complications documented      /91 (02/01/22 0820)    Temp 97 7 °F (36 5 °C) (02/01/22 0820)    Pulse 73 (02/01/22 0820)   Resp 16 (02/01/22 0820)    SpO2 94 % (02/01/22 0820)

## 2022-02-04 ENCOUNTER — TELEPHONE (OUTPATIENT)
Dept: GASTROENTEROLOGY | Facility: CLINIC | Age: 62
End: 2022-02-04

## 2022-02-04 NOTE — TELEPHONE ENCOUNTER
----- Message from Ranjeet Crane DO sent at 2/4/2022  8:17 AM EST -----  Please call the patient with the biopsy results  Biopsies the stomach were benign and negative for Helicobacter pylori or for cancer  The biopsies of the esophagus did show a short segment of Le's esophagus  His next upper endoscopy will be due in 3 years  The polyp removed from the rectum was a benign polyp  His next colonoscopy will be due in 5 years

## 2022-02-09 DIAGNOSIS — K21.9 GASTROESOPHAGEAL REFLUX DISEASE WITHOUT ESOPHAGITIS: ICD-10-CM

## 2022-10-03 ENCOUNTER — OFFICE VISIT (OUTPATIENT)
Dept: DERMATOLOGY | Facility: CLINIC | Age: 62
End: 2022-10-03
Payer: COMMERCIAL

## 2022-10-03 VITALS — HEIGHT: 72 IN | WEIGHT: 240 LBS | BODY MASS INDEX: 32.51 KG/M2

## 2022-10-03 DIAGNOSIS — Z85.828 HISTORY OF SKIN CANCER: ICD-10-CM

## 2022-10-03 DIAGNOSIS — L82.1 SEBORRHEIC KERATOSIS: ICD-10-CM

## 2022-10-03 DIAGNOSIS — Z13.89 SCREENING FOR SKIN CONDITION: ICD-10-CM

## 2022-10-03 DIAGNOSIS — L40.9 PSORIASIS: Primary | ICD-10-CM

## 2022-10-03 PROCEDURE — 99213 OFFICE O/P EST LOW 20 MIN: CPT | Performed by: DERMATOLOGY

## 2022-10-03 NOTE — PROGRESS NOTES
[address]  [address] [facility] [place] [address]  [phone]  [phone]     MRN: [id number] : 1960  Encounter: [id number]  Patient Information: Kathryn France III  Chief complaint: yearly check up    History of present illness:  21-year-old male presents for overall skin check previous history of nonmelanoma skin cancer actinic can psoriasis no complaints at this time  Past Medical History:   Diagnosis Date    Diverticulitis of colon     Dyskinesia     Fatty liver     Hypertension     Lupus ([address] )     Movement disorder     Stroke [facility])      Past Surgical History:   Procedure Laterality Date    ANKLE SURGERY      COLONOSCOPY  2018    HAND SURGERY      left index surgery    KNEE SURGERY      bi lateral meniscus tears both x2    GA ESOPHAGOGASTRODUODENOSCOPY TRANSORAL DIAGNOSTIC N/A 3/8/2019    Procedure: ESOPHAGOGASTRODUODENOSCOPY (EGD); Surgeon: Den Connolly DO;  Location: [facility]; Service: Gastroenterology    SHOULDER SURGERY      SINUS SURGERY      TONSILLECTOMY       Social History   Social History     Substance and Sexual Activity   Alcohol Use Yes    Comment: social     Social History     Substance and Sexual Activity   Drug Use Never     Social History     Tobacco Use   Smoking Status Never Smoker   Smokeless Tobacco Never Used     Family History   Problem Relation Age of Onset    Hypertension Father      Meds/Allergies   Allergies   Allergen Reactions    Methyldopa      Other reaction(s): Unknown    Sertraline      tardivedyskinsia       Meds:  Prior to Admission medications    Medication Sig Start Date End Date Taking?  Authorizing Provider   amLODIPine (NORVASC) 10 mg tablet Take 10 mg by mouth daily   Yes Historical Provider, MD   aspirin 81 MG tablet Daily   Yes Historical Provider, MD   atorvastatin (LIPITOR) 10 mg tablet atorvastatin 10 mg tablet   Yes Historical Provider, MD   betamethasone, augmented, (DIPROLENE) 0 05 % ointment Apply topically 2 (two) times a day To hands and feet 8/24/18  Yes Martin Yung MD   Butalbital-APAP-Caffeine -40 MG CAPS TAKE ONE CAPSULE BY MOUTH EVERY 4 HOURS   Yes Historical Provider, MD   Cholecalciferol (VITAMIN D3) 2000 units capsule Daily   Yes Historical Provider, MD   Cholecalciferol 38439 units CAPS Take 2,000 Units by mouth   Yes Historical Provider, MD   clonazePAM (KlonoPIN) 0 5 mg tablet  6/8/18  Yes Historical Provider, MD   clopidogrel (PLAVIX) 75 mg tablet Take 75 mg by mouth daily 4/24/19  Yes Historical Provider, MD   Dexilant 60 MG capsule TAKE 1 CAPSULE BY MOUTH EVERY DAY 2/9/22  Yes Jacobo Rock PA-C   Diclofenac Sodium (Pennsaid) 2 % SOLN Pennsaid 20 mg/gram/actuation (2 %) topical soln in metered-dose pump   APPLY 2 PUMPS (40 MG) TO THE AFFECTED AREA BY TOPICAL ROUTE 2 TIMES PER DAY   Yes Historical Provider, MD   fenofibrate (TRICOR) 145 mg tablet Take 145 mg by mouth daily 7/29/19  Yes Historical Provider, MD   hydroxychloroquine (PLAQUENIL) 200 mg tablet Take 200 mg by mouth 2 (two) times a day with meals   Yes Historical Provider, MD   Methylcellulose, Laxative, (CITRUCEL PO) Take 2 tablets by mouth daily   Yes Historical Provider, MD   metroNIDAZOLE (FLAGYL) 500 mg tablet Take 500 mg by mouth every 8 (eight) hours   Yes Historical Provider, MD   Multiple Vitamins-Minerals (MULTIVITAMIN ADULT EXTRA C PO) multivitamin   Yes Historical Provider, MD   Omega-3 Fatty Acids (FISH OIL) 1,000 mg Every 12 hours   Yes Historical Provider, MD   Probiotic Product (PROBIOTIC-10 PO) Daily   Yes Historical Provider, MD   Propranolol HCl (INDERAL PO) Take 80 mg by mouth   Yes Historical Provider, MD   sulfamethoxazole-trimethoprim (BACTRIM) 400-80 mg per tablet Take 2 tablets by mouth every 12 (twelve) hours   Yes Historical Provider, MD   tadalafil (CIALIS) 20 MG tablet  10/11/21  Yes Historical Provider, MD   traMADol (ULTRAM) 50 mg tablet TAKE ONE TABLET BY MOUTH THREE TIMES A DAY 3/29/21  Yes Historical Provider, MD   betamethasone dipropionate (DIPROSONE) 0 05 % ointment Apply topically 2 (two) times a day 8/22/18 8/22/19  Historical Provider, MD   ibuprofen (MOTRIN) 800 mg tablet TAKE ONE TABLET BY MOUTH THREE TIMES A DAY AS NEEDED FOR PAIN OR INFLAMMATION-TAKE WITH FOOD OR MILK 3/29/21 3/30/22  Historical Provider, MD   propranolol (INNOPRAN XL) 80 MG 24 hr capsule TAKE ONE CAPSULE BY MOUTH DAILY FOR BLOOD PRESSURE/HEART 3/29/21 3/30/22  Historical Provider, MD       Subjective:     Review of Systems:    General: negative for - chills, fatigue, fever,  weight gain or weight loss  Psychological: negative for - anxiety, behavioral disorder, concentration difficulties, decreased libido, depression, irritability, memory difficulties, mood swings, sleep disturbances or suicidal ideation  ENT: negative for - hearing difficulties , nasal congestion, nasal discharge, oral lesions, sinus pain, sneezing, sore throat  Allergy and Immunology: negative for - hives, insect bite sensitivity,  Hematological and Lymphatic: negative for - bleeding problems, blood clots,bruising, swollen lymph nodes  Endocrine: negative for - hair pattern changes, hot flashes, malaise/lethargy, mood swings, palpitations, polydipsia/polyuria, skin changes, temperature intolerance or unexpected weight change  Respiratory: negative for - cough, hemoptysis, orthopnea, shortness of breath, or wheezing  Cardiovascular: negative for - chest pain, dyspnea on exertion, edema,  Gastrointestinal: negative for - abdominal pain, nausea/vomiting  Genito-Urinary: negative for - dysuria, incontinence, irregular/heavy menses or urinary frequency/urgency  Musculoskeletal: negative for - gait disturbance, joint pain, joint stiffness, joint swelling, muscle pain, muscular weakness  Dermatological:  As in HPI  Neurological: negative for confusion, dizziness, headaches, impaired coordination/balance, memory loss, numbness/tingling, seizures, speech problems, tremors or weakness       Objective:   Ht 6' (1 829 m)   Wt 109 kg (240 lb)   BMI 32 55 kg/m²     Physical Exam:    General Appearance:    Alert, cooperative, no distress   Head:    Normocephalic, without obvious abnormality, atraumatic           Skin:   A full skin exam was performed including scalp, head scalp, eyes, ears, nose, lips, neck, chest, axilla, abdomen, back, buttocks, bilateral upper extremities, bilateral lower extremities, hands, feet, fingers, toes, fingernails, and toenails no active psoriasis noted normal keratotic papules with greasy stuck on appearance previous sites skin cancer well healed without recurrence nothing else atypical noted on complete exam     Assessment:     1  Psoriasis     2  Seborrheic keratosis     3  History of skin cancer     4  Screening for skin condition           Plan:   Psoriasis not active no treatment indicated at this time  Seborrheic keratosis patient reassured these are normal growths we acquire with age no treatment needed  History of skin cancer in no recurrence nothing else atypical sunblock recommended follow-up in 1 year  Screening for dermatologic disorders nothing else of concern noted on complete exam follow-up in 1 year      Abby Ceja  10/3/2022,4:24 PM    Portions of the record may have been created with voice recognition software   Occasional wrong word or "sound a like" substitutions may have occurred due to the inherent limitations of voice recognition software   Read the chart carefully and recognize, using context, where substitutions have occurred

## 2022-10-03 NOTE — PATIENT INSTRUCTIONS
Psoriasis not active no treatment indicated at this time  Seborrheic keratosis patient reassured these are normal growths we acquire with age no treatment needed  History of skin cancer in no recurrence nothing else atypical sunblock recommended follow-up in 1 year  Screening for dermatologic disorders nothing else of concern noted on complete exam follow-up in 1 year

## 2023-03-12 DIAGNOSIS — K21.9 GASTROESOPHAGEAL REFLUX DISEASE WITHOUT ESOPHAGITIS: ICD-10-CM

## 2023-03-13 RX ORDER — DEXLANSOPRAZOLE 60 MG/1
CAPSULE, DELAYED RELEASE ORAL
Qty: 30 CAPSULE | Refills: 32 | Status: SHIPPED | OUTPATIENT
Start: 2023-03-13

## 2024-02-21 PROBLEM — Z13.89 SCREENING FOR SKIN CONDITION: Status: RESOLVED | Noted: 2018-06-20 | Resolved: 2024-02-21

## 2024-05-03 ENCOUNTER — TELEPHONE (OUTPATIENT)
Age: 64
End: 2024-05-03

## 2024-05-08 DIAGNOSIS — K21.9 GASTROESOPHAGEAL REFLUX DISEASE WITHOUT ESOPHAGITIS: ICD-10-CM

## 2024-05-08 RX ORDER — DEXLANSOPRAZOLE 60 MG/1
CAPSULE, DELAYED RELEASE ORAL
Qty: 30 CAPSULE | Refills: 32 | Status: SHIPPED | OUTPATIENT
Start: 2024-05-08

## 2025-06-19 DIAGNOSIS — K21.9 GASTROESOPHAGEAL REFLUX DISEASE WITHOUT ESOPHAGITIS: ICD-10-CM

## 2025-06-19 RX ORDER — DEXLANSOPRAZOLE 60 MG/1
1 CAPSULE, DELAYED RELEASE ORAL DAILY
Qty: 30 CAPSULE | Refills: 29 | Status: SHIPPED | OUTPATIENT
Start: 2025-06-19

## 2025-06-19 NOTE — TELEPHONE ENCOUNTER
Patient called to request a refill for their dexlansoprazole (DEXILANT) 60 MG capsule  advised a refill was requested on 06/19/25 and is pending approval. Patient verbalized understanding and is in agreement.     Does the patient have enough for 3 days?   [] Yes   [x] No - Send as HP to POD

## 2025-07-12 DIAGNOSIS — K21.9 GASTROESOPHAGEAL REFLUX DISEASE WITHOUT ESOPHAGITIS: ICD-10-CM

## 2025-07-14 RX ORDER — DEXLANSOPRAZOLE 60 MG/1
1 CAPSULE, DELAYED RELEASE ORAL DAILY
Qty: 90 CAPSULE | Refills: 0 | Status: SHIPPED | OUTPATIENT
Start: 2025-07-14